# Patient Record
Sex: FEMALE | Race: WHITE | Employment: OTHER | URBAN - NONMETROPOLITAN AREA
[De-identification: names, ages, dates, MRNs, and addresses within clinical notes are randomized per-mention and may not be internally consistent; named-entity substitution may affect disease eponyms.]

---

## 2017-04-18 ENCOUNTER — HOSPITAL ENCOUNTER (OUTPATIENT)
Age: 82
Setting detail: SPECIMEN
Discharge: HOME | End: 2017-04-18
Payer: MEDICARE

## 2017-04-18 ENCOUNTER — OFFICE VISIT (OUTPATIENT)
Dept: UROLOGY | Age: 82
End: 2017-04-18
Payer: MEDICARE

## 2017-04-18 VITALS
SYSTOLIC BLOOD PRESSURE: 102 MMHG | WEIGHT: 187 LBS | HEIGHT: 59 IN | DIASTOLIC BLOOD PRESSURE: 64 MMHG | BODY MASS INDEX: 37.7 KG/M2

## 2017-04-18 DIAGNOSIS — N32.81 OAB (OVERACTIVE BLADDER): ICD-10-CM

## 2017-04-18 DIAGNOSIS — N39.46 MIXED INCONTINENCE: ICD-10-CM

## 2017-04-18 DIAGNOSIS — N32.81 OAB (OVERACTIVE BLADDER): Primary | ICD-10-CM

## 2017-04-18 LAB
-: ABNORMAL
AMORPHOUS: ABNORMAL
BACTERIA: ABNORMAL
BILIRUBIN URINE: NEGATIVE
CASTS UA: ABNORMAL /LPF
COLOR: YELLOW
COMMENT UA: ABNORMAL
CRYSTALS, UA: ABNORMAL /HPF
EPITHELIAL CELLS UA: ABNORMAL /HPF (ref 0–25)
GLUCOSE URINE: ABNORMAL
KETONES, URINE: NEGATIVE
LEUKOCYTE ESTERASE, URINE: NEGATIVE
MUCUS: ABNORMAL
NITRITE, URINE: NEGATIVE
OTHER OBSERVATIONS UA: ABNORMAL
PH UA: 5.5 (ref 5–9)
PROTEIN UA: NEGATIVE
RBC UA: ABNORMAL /HPF (ref 0–2)
RENAL EPITHELIAL, UA: ABNORMAL /HPF
SPECIFIC GRAVITY UA: 1.02 (ref 1.01–1.02)
TRICHOMONAS: ABNORMAL
TURBIDITY: CLEAR
URINE HGB: ABNORMAL
UROBILINOGEN, URINE: NORMAL
WBC UA: ABNORMAL /HPF (ref 0–5)
YEAST: ABNORMAL

## 2017-04-18 PROCEDURE — G8417 CALC BMI ABV UP PARAM F/U: HCPCS | Performed by: PHYSICIAN ASSISTANT

## 2017-04-18 PROCEDURE — 0509F URINE INCON PLAN DOCD: CPT | Performed by: PHYSICIAN ASSISTANT

## 2017-04-18 PROCEDURE — 51798 US URINE CAPACITY MEASURE: CPT | Performed by: PHYSICIAN ASSISTANT

## 2017-04-18 PROCEDURE — 4040F PNEUMOC VAC/ADMIN/RCVD: CPT | Performed by: PHYSICIAN ASSISTANT

## 2017-04-18 PROCEDURE — 81001 URINALYSIS AUTO W/SCOPE: CPT

## 2017-04-18 PROCEDURE — 99213 OFFICE O/P EST LOW 20 MIN: CPT | Performed by: PHYSICIAN ASSISTANT

## 2017-04-18 PROCEDURE — G8427 DOCREV CUR MEDS BY ELIG CLIN: HCPCS | Performed by: PHYSICIAN ASSISTANT

## 2017-04-18 PROCEDURE — 1090F PRES/ABSN URINE INCON ASSESS: CPT | Performed by: PHYSICIAN ASSISTANT

## 2017-04-18 PROCEDURE — 87086 URINE CULTURE/COLONY COUNT: CPT

## 2017-04-18 PROCEDURE — 1036F TOBACCO NON-USER: CPT | Performed by: PHYSICIAN ASSISTANT

## 2017-04-18 PROCEDURE — G8400 PT W/DXA NO RESULTS DOC: HCPCS | Performed by: PHYSICIAN ASSISTANT

## 2017-04-18 PROCEDURE — 1123F ACP DISCUSS/DSCN MKR DOCD: CPT | Performed by: PHYSICIAN ASSISTANT

## 2017-04-18 RX ORDER — TOLTERODINE TARTRATE 1 MG/1
TABLET, EXTENDED RELEASE ORAL
COMMUNITY
Start: 2017-04-11 | End: 2017-04-18

## 2017-04-18 ASSESSMENT — ENCOUNTER SYMPTOMS
ABDOMINAL DISTENTION: 0
NAUSEA: 0
ABDOMINAL PAIN: 1
VOMITING: 0
CONSTIPATION: 0
DIARRHEA: 0

## 2017-04-19 LAB
CULTURE: NO GROWTH
CULTURE: NORMAL
Lab: NORMAL
SPECIMEN DESCRIPTION: NORMAL
SPECIMEN DESCRIPTION: NORMAL
STATUS: NORMAL

## 2017-04-25 ENCOUNTER — TELEPHONE (OUTPATIENT)
Dept: UROLOGY | Age: 82
End: 2017-04-25

## 2017-08-09 ENCOUNTER — OFFICE VISIT (OUTPATIENT)
Dept: UROLOGY | Age: 82
End: 2017-08-09
Payer: MEDICARE

## 2017-08-09 VITALS
WEIGHT: 185 LBS | HEIGHT: 59 IN | SYSTOLIC BLOOD PRESSURE: 120 MMHG | BODY MASS INDEX: 37.29 KG/M2 | DIASTOLIC BLOOD PRESSURE: 78 MMHG

## 2017-08-09 DIAGNOSIS — N39.46 MIXED INCONTINENCE: ICD-10-CM

## 2017-08-09 DIAGNOSIS — N32.81 OAB (OVERACTIVE BLADDER): Primary | ICD-10-CM

## 2017-08-09 PROCEDURE — 51798 US URINE CAPACITY MEASURE: CPT | Performed by: NURSE PRACTITIONER

## 2017-08-09 PROCEDURE — 1090F PRES/ABSN URINE INCON ASSESS: CPT | Performed by: NURSE PRACTITIONER

## 2017-08-09 PROCEDURE — 4040F PNEUMOC VAC/ADMIN/RCVD: CPT | Performed by: NURSE PRACTITIONER

## 2017-08-09 PROCEDURE — G8427 DOCREV CUR MEDS BY ELIG CLIN: HCPCS | Performed by: NURSE PRACTITIONER

## 2017-08-09 PROCEDURE — G8400 PT W/DXA NO RESULTS DOC: HCPCS | Performed by: NURSE PRACTITIONER

## 2017-08-09 PROCEDURE — 1123F ACP DISCUSS/DSCN MKR DOCD: CPT | Performed by: NURSE PRACTITIONER

## 2017-08-09 PROCEDURE — 99213 OFFICE O/P EST LOW 20 MIN: CPT | Performed by: NURSE PRACTITIONER

## 2017-08-09 PROCEDURE — G8417 CALC BMI ABV UP PARAM F/U: HCPCS | Performed by: NURSE PRACTITIONER

## 2017-08-09 PROCEDURE — 0509F URINE INCON PLAN DOCD: CPT | Performed by: NURSE PRACTITIONER

## 2017-08-09 PROCEDURE — 1036F TOBACCO NON-USER: CPT | Performed by: NURSE PRACTITIONER

## 2017-08-09 RX ORDER — TOLTERODINE TARTRATE 1 MG/1
2 TABLET, EXTENDED RELEASE ORAL 2 TIMES DAILY
COMMUNITY
Start: 2017-07-12 | End: 2020-08-24

## 2017-08-09 RX ORDER — MELOXICAM 15 MG/1
TABLET ORAL
COMMUNITY
Start: 2017-06-06 | End: 2021-04-27

## 2017-08-09 RX ORDER — LEVOTHYROXINE SODIUM 0.1 MG/1
TABLET ORAL
COMMUNITY
Start: 2017-07-12 | End: 2021-04-27

## 2017-08-09 ASSESSMENT — ENCOUNTER SYMPTOMS
EYE PAIN: 0
COUGH: 0
VOMITING: 0
ABDOMINAL PAIN: 0
EYE REDNESS: 0
WHEEZING: 0
COLOR CHANGE: 0
BACK PAIN: 0
SHORTNESS OF BREATH: 0
NAUSEA: 0
CONSTIPATION: 0

## 2020-08-21 ENCOUNTER — HOSPITAL ENCOUNTER (OUTPATIENT)
Age: 85
Setting detail: SPECIMEN
Discharge: HOME | End: 2020-08-21
Payer: MEDICARE

## 2020-08-21 ENCOUNTER — OFFICE VISIT (OUTPATIENT)
Dept: UROLOGY | Age: 85
End: 2020-08-21
Payer: MEDICARE

## 2020-08-21 VITALS
WEIGHT: 173 LBS | BODY MASS INDEX: 34.88 KG/M2 | SYSTOLIC BLOOD PRESSURE: 132 MMHG | DIASTOLIC BLOOD PRESSURE: 72 MMHG | HEIGHT: 59 IN | TEMPERATURE: 97.3 F

## 2020-08-21 LAB
-: ABNORMAL
AMORPHOUS: ABNORMAL
BACTERIA: ABNORMAL
BILIRUBIN URINE: NEGATIVE
CASTS UA: ABNORMAL /LPF
COLOR: YELLOW
COMMENT UA: ABNORMAL
CRYSTALS, UA: ABNORMAL /HPF
EPITHELIAL CELLS UA: ABNORMAL /HPF (ref 0–25)
GLUCOSE URINE: NEGATIVE
KETONES, URINE: NEGATIVE
LEUKOCYTE ESTERASE, URINE: ABNORMAL
MUCUS: ABNORMAL
NITRITE, URINE: NEGATIVE
OTHER OBSERVATIONS UA: ABNORMAL
PH UA: 6 (ref 5–9)
PROTEIN UA: NEGATIVE
RBC UA: ABNORMAL /HPF (ref 0–2)
RENAL EPITHELIAL, UA: ABNORMAL /HPF
SPECIFIC GRAVITY UA: 1.02 (ref 1.01–1.02)
TRICHOMONAS: ABNORMAL
TURBIDITY: CLEAR
URINE HGB: NEGATIVE
UROBILINOGEN, URINE: NORMAL
WBC UA: ABNORMAL /HPF (ref 0–5)
YEAST: ABNORMAL

## 2020-08-21 PROCEDURE — 99211 OFF/OP EST MAY X REQ PHY/QHP: CPT | Performed by: UROLOGY

## 2020-08-21 PROCEDURE — 51798 US URINE CAPACITY MEASURE: CPT | Performed by: NURSE PRACTITIONER

## 2020-08-21 PROCEDURE — G8400 PT W/DXA NO RESULTS DOC: HCPCS | Performed by: NURSE PRACTITIONER

## 2020-08-21 PROCEDURE — G8417 CALC BMI ABV UP PARAM F/U: HCPCS | Performed by: NURSE PRACTITIONER

## 2020-08-21 PROCEDURE — 1123F ACP DISCUSS/DSCN MKR DOCD: CPT | Performed by: NURSE PRACTITIONER

## 2020-08-21 PROCEDURE — 81001 URINALYSIS AUTO W/SCOPE: CPT

## 2020-08-21 PROCEDURE — 87086 URINE CULTURE/COLONY COUNT: CPT

## 2020-08-21 PROCEDURE — 1090F PRES/ABSN URINE INCON ASSESS: CPT | Performed by: NURSE PRACTITIONER

## 2020-08-21 PROCEDURE — 99204 OFFICE O/P NEW MOD 45 MIN: CPT | Performed by: NURSE PRACTITIONER

## 2020-08-21 PROCEDURE — 0509F URINE INCON PLAN DOCD: CPT | Performed by: NURSE PRACTITIONER

## 2020-08-21 PROCEDURE — 1036F TOBACCO NON-USER: CPT | Performed by: NURSE PRACTITIONER

## 2020-08-21 PROCEDURE — G8427 DOCREV CUR MEDS BY ELIG CLIN: HCPCS | Performed by: NURSE PRACTITIONER

## 2020-08-21 PROCEDURE — 4040F PNEUMOC VAC/ADMIN/RCVD: CPT | Performed by: NURSE PRACTITIONER

## 2020-08-21 RX ORDER — AMLODIPINE BESYLATE 5 MG/1
TABLET ORAL
COMMUNITY
Start: 2020-06-05

## 2020-08-21 RX ORDER — METOPROLOL TARTRATE 100 MG/1
100 TABLET ORAL 2 TIMES DAILY
COMMUNITY
End: 2021-04-27

## 2020-08-21 NOTE — PATIENT INSTRUCTIONS
There are several changes you can make to your diet to help improve your urinary symptoms. The following have been shown to irritate the bladder and should be AVOIDED:  · Coffee  · Tea  · Dark colored sodas, avoid Mt. Dew also  · Alcohol  · Spicy foods  · Acidic foods    Decrease coffee to 1-2 cups per day      SURVEY:    You may be receiving a survey from [employer] regarding your visit today. Please complete the survey to enable us to provide the highest quality of care to you and your family. If you cannot score us a very good on any question, please call the office to discuss how we could of made your experience a very good one. Thank you.

## 2020-08-21 NOTE — PROGRESS NOTES
HPI:    Patient is a 80 y.o. female in no acute distress. She is alert and oriented to person, place, and time. History  2016 referred by PCP for incontinence x 2-3 years. Wears one pad per day which varies in level of saturation. Does not leak overnight. Typically doesn't have nocturia unless she drinks something right before bed. During day she has q2hr frequency, severe urgency, with UUI. She also leaks when she stands up from seated position and when she runs water. She does not notice significant leakage with cough, laugh, sneeze. Tried Ditropan, but never noticed improvement. Improvement with Vesicare, but unable to afford medication due to medication deductible needing met. Detrol 1 mg twice per day. She is urinating every 3 hours during the day, and she is getting up one time per night to urinate. She denies any urinary leakage, urgency, dysuria, or gross hematuria. Today  Here today with complaints of worsening incontinence. We have not seen patient since 2017. She does complain of frequency every 30 minutes to 1 hour. She has nocturia x1. She does complain of both urge and stress incontinence. She is wearing 3-4 pads per day. She denies any dysuria or gross hematuria. She does have daily bowel movements. She does consume 3 cups of coffee daily.  mL. She is no longer taking Detrol twice per day as previously prescribed. She does not know when or why this was stopped.       Past Medical History:   Diagnosis Date    Hyperlipidemia     Hypertension     Urinary incontinence      Past Surgical History:   Procedure Laterality Date    BACK SURGERY      CHOLECYSTECTOMY      COLONOSCOPY      HYSTERECTOMY      JOINT REPLACEMENT       Outpatient Encounter Medications as of 8/21/2020   Medication Sig Dispense Refill    amLODIPine (NORVASC) 5 MG tablet TAKE 1 TABLET BY MOUTH ONCE DAILY      levothyroxine (SYNTHROID) 100 MCG tablet       [DISCONTINUED] tolterodine (DETROL) 1 MG tablet 2 mg 2 times daily       losartan (COZAAR) 100 MG tablet       metoprolol succinate ER (TOPROL-XL) 100 MG XL tablet       losartan-hydrochlorothiazide (HYZAAR) 100-25 MG per tablet Take 1 tablet by mouth daily      calcium carbonate 600 MG TABS tablet Take 1 tablet by mouth daily      Multiple Vitamins-Minerals (ONE-A-DAY 50 PLUS PO) Take by mouth      terazosin (HYTRIN) 2 MG capsule Take 2 mg by mouth nightly      omeprazole (PRILOSEC) 20 MG capsule Take 20 mg by mouth daily      atorvastatin (LIPITOR) 40 MG tablet Take 40 mg by mouth daily      metoprolol (LOPRESSOR) 100 MG tablet Take 100 mg by mouth 2 times daily      meloxicam (MOBIC) 15 MG tablet       [DISCONTINUED] metoprolol (LOPRESSOR) 100 MG tablet Take 100 mg by mouth 2 times daily       No facility-administered encounter medications on file as of 8/21/2020. Current Outpatient Medications on File Prior to Visit   Medication Sig Dispense Refill    amLODIPine (NORVASC) 5 MG tablet TAKE 1 TABLET BY MOUTH ONCE DAILY      levothyroxine (SYNTHROID) 100 MCG tablet       losartan (COZAAR) 100 MG tablet       metoprolol succinate ER (TOPROL-XL) 100 MG XL tablet       losartan-hydrochlorothiazide (HYZAAR) 100-25 MG per tablet Take 1 tablet by mouth daily      calcium carbonate 600 MG TABS tablet Take 1 tablet by mouth daily      Multiple Vitamins-Minerals (ONE-A-DAY 50 PLUS PO) Take by mouth      terazosin (HYTRIN) 2 MG capsule Take 2 mg by mouth nightly      omeprazole (PRILOSEC) 20 MG capsule Take 20 mg by mouth daily      atorvastatin (LIPITOR) 40 MG tablet Take 40 mg by mouth daily      metoprolol (LOPRESSOR) 100 MG tablet Take 100 mg by mouth 2 times daily      meloxicam (MOBIC) 15 MG tablet        No current facility-administered medications on file prior to visit.       Percocet [oxycodone-acetaminophen] and Sulfa antibiotics  Family History   Problem Relation Age of Onset    Stroke Mother    Lance Quigley Other Father Social History     Tobacco Use   Smoking Status Never Smoker   Smokeless Tobacco Never Used       Social History     Substance and Sexual Activity   Alcohol Use No       Review of Systems   Constitutional: Negative for appetite change, chills and fever. Eyes: Negative for pain, redness and visual disturbance. Respiratory: Negative for cough, shortness of breath and wheezing. Cardiovascular: Negative for chest pain and leg swelling. Gastrointestinal: Negative for abdominal pain, constipation, nausea and vomiting. Genitourinary: Positive for enuresis, frequency and urgency. Negative for difficulty urinating, dysuria, flank pain, hematuria, pelvic pain, vaginal bleeding and vaginal discharge. Musculoskeletal: Negative for back pain, joint swelling and myalgias. Skin: Negative for color change, rash and wound. Neurological: Negative for dizziness, tremors and numbness. Hematological: Negative for adenopathy. Does not bruise/bleed easily. /72 (Site: Right Upper Arm, Position: Sitting, Cuff Size: Medium Adult)   Temp 97.3 °F (36.3 °C) (Temporal)   Ht 4' 11\" (1.499 m)   Wt 173 lb (78.5 kg)   BMI 34.94 kg/m²       PHYSICAL EXAM:  Constitutional: Patient resting comfortably, in no acute distress. Neuro: Alert and oriented to person place and time. Cranial nerves grossly intact. Psych: Mood and affect normal.  Skin: Warm, dry  HEENT: normocephalic, atraumatic  Lymphatics: No palpable lymphadenopathy  Lungs: Respiratory effort normal, unlabored  Cardiovascular:  Normal peripheral pulses  Abdomen: Soft, non-tender, non-distended with no organomegaly or palpable masses. : No CVA tenderness. Bladder non-tender and not distended. Pelvic: Deferred    No results found for: BUN  No results found for: CREATININE    ASSESSMENT:   Diagnosis Orders   1. Frequency of urination  WI MEASUREMENT,POST-VOID RESIDUAL VOLUME BY US,NON-IMAGING    Urinalysis with Microscopic    Culture, Urine   2. Mixed incontinence  MO MEASUREMENT,POST-VOID RESIDUAL VOLUME BY US,NON-IMAGING    Urinalysis with Microscopic    Culture, Urine           PLAN:  We will check a UA C&S. If the urine culture is negative we will resume Detrol twice per day. She will follow-up in 6 weeks or sooner if needed.

## 2020-08-22 LAB
CULTURE: NORMAL
Lab: NORMAL
SPECIMEN DESCRIPTION: NORMAL

## 2020-08-24 ENCOUNTER — TELEPHONE (OUTPATIENT)
Dept: UROLOGY | Age: 85
End: 2020-08-24

## 2020-08-24 RX ORDER — TOLTERODINE TARTRATE 2 MG/1
1 TABLET, EXTENDED RELEASE ORAL 2 TIMES DAILY
Qty: 60 TABLET | Refills: 1 | Status: SHIPPED | OUTPATIENT
Start: 2020-08-24 | End: 2020-08-26

## 2020-08-24 NOTE — TELEPHONE ENCOUNTER
Patient notified of negative urine culture results and new medication.  Patient voiced understanding and scheduled f/u

## 2020-08-25 ASSESSMENT — ENCOUNTER SYMPTOMS
COLOR CHANGE: 0
COUGH: 0
BACK PAIN: 0
EYE REDNESS: 0
VOMITING: 0
NAUSEA: 0
WHEEZING: 0
EYE PAIN: 0
ABDOMINAL PAIN: 0
SHORTNESS OF BREATH: 0
CONSTIPATION: 0

## 2020-08-26 ENCOUNTER — TELEPHONE (OUTPATIENT)
Dept: UROLOGY | Age: 85
End: 2020-08-26

## 2020-08-27 RX ORDER — TOLTERODINE TARTRATE 2 MG/1
1 TABLET, EXTENDED RELEASE ORAL 2 TIMES DAILY
Qty: 60 TABLET | Refills: 1 | Status: SHIPPED | OUTPATIENT
Start: 2020-08-27 | End: 2020-10-13 | Stop reason: SDUPTHER

## 2020-10-13 ENCOUNTER — OFFICE VISIT (OUTPATIENT)
Dept: UROLOGY | Age: 85
End: 2020-10-13
Payer: MEDICARE

## 2020-10-13 VITALS
BODY MASS INDEX: 35.9 KG/M2 | DIASTOLIC BLOOD PRESSURE: 78 MMHG | RESPIRATION RATE: 20 BRPM | HEIGHT: 59 IN | TEMPERATURE: 96.9 F | SYSTOLIC BLOOD PRESSURE: 116 MMHG | WEIGHT: 178.1 LBS

## 2020-10-13 PROCEDURE — 4040F PNEUMOC VAC/ADMIN/RCVD: CPT | Performed by: NURSE PRACTITIONER

## 2020-10-13 PROCEDURE — 0509F URINE INCON PLAN DOCD: CPT | Performed by: NURSE PRACTITIONER

## 2020-10-13 PROCEDURE — G8427 DOCREV CUR MEDS BY ELIG CLIN: HCPCS | Performed by: NURSE PRACTITIONER

## 2020-10-13 PROCEDURE — 1090F PRES/ABSN URINE INCON ASSESS: CPT | Performed by: NURSE PRACTITIONER

## 2020-10-13 PROCEDURE — G8484 FLU IMMUNIZE NO ADMIN: HCPCS | Performed by: NURSE PRACTITIONER

## 2020-10-13 PROCEDURE — G8417 CALC BMI ABV UP PARAM F/U: HCPCS | Performed by: NURSE PRACTITIONER

## 2020-10-13 PROCEDURE — 51798 US URINE CAPACITY MEASURE: CPT | Performed by: NURSE PRACTITIONER

## 2020-10-13 PROCEDURE — 1123F ACP DISCUSS/DSCN MKR DOCD: CPT | Performed by: NURSE PRACTITIONER

## 2020-10-13 PROCEDURE — 1036F TOBACCO NON-USER: CPT | Performed by: NURSE PRACTITIONER

## 2020-10-13 PROCEDURE — G8400 PT W/DXA NO RESULTS DOC: HCPCS | Performed by: NURSE PRACTITIONER

## 2020-10-13 PROCEDURE — 99214 OFFICE O/P EST MOD 30 MIN: CPT | Performed by: NURSE PRACTITIONER

## 2020-10-13 RX ORDER — TOLTERODINE TARTRATE 2 MG/1
2 TABLET, EXTENDED RELEASE ORAL 2 TIMES DAILY
Qty: 60 TABLET | Refills: 1 | Status: SHIPPED | OUTPATIENT
Start: 2020-10-13 | End: 2020-11-24 | Stop reason: SDUPTHER

## 2020-10-13 RX ORDER — LEVOTHYROXINE SODIUM 88 UG/1
TABLET ORAL
COMMUNITY
Start: 2020-10-05

## 2020-10-13 NOTE — PROGRESS NOTES
HPI:        Patient is a 80 y.o. female in no acute distress. She is alert and oriented to person, place, and time. History  2016 referred by PCP for incontinence x 2-3 years. Wears one pad per day which varies in level of saturation. Does not leak overnight. Typically doesn't have nocturia unless she drinks something right before bed. During day she has q2hr frequency, severe urgency, with UUI. She also leaks when she stands up from seated position and when she runs water. She does not notice significant leakage with cough, laugh, sneeze. Tried Ditropan, but never noticed improvement. Improvement with Vesicare, but unable to afford medication due to medication deductible needing met. Detrol 1 mg twice per day. She is urinating every 3 hours during the day, and she is getting up one time per night to urinate. She denies any urinary leakage, urgency, dysuria, or gross hematuria. 9/2020 frequency every 30 minutes to 1 hour. She has nocturia x1. She does complain of both urge and stress incontinence. She is wearing 3-4 pads per day. Resumed detrol 1mg BID    Today  Here today to follow-up for frequency and incontinence. At her last visit we did resume Detrol 1 mg twice per day. She denies any dry mouth or constipation. She does feel that her nocturia has improved and she is urinating every 2 hours during the day. She does continue to complain of incontinence and wearing 3-4 pads per day. She does feel her incontinence is worse when she goes from a standing to sitting position. PVR 26 mL.   Past Medical History:   Diagnosis Date    Hyperlipidemia     Hypertension     Urinary incontinence      Past Surgical History:   Procedure Laterality Date    BACK SURGERY      CHOLECYSTECTOMY      COLONOSCOPY      HYSTERECTOMY      JOINT REPLACEMENT       Outpatient Encounter Medications as of 10/13/2020   Medication Sig Dispense Refill    levothyroxine (SYNTHROID) 88 MCG tablet TAKE 1 TABLET BY MOUTH ONCE DAILY      tolterodine (DETROL) 2 MG tablet Take 0.5 tablets by mouth 2 times daily 60 tablet 1    amLODIPine (NORVASC) 5 MG tablet TAKE 1 TABLET BY MOUTH ONCE DAILY      metoprolol succinate ER (TOPROL-XL) 100 MG XL tablet       losartan-hydrochlorothiazide (HYZAAR) 100-25 MG per tablet Take 1 tablet by mouth daily      calcium carbonate 600 MG TABS tablet Take 1 tablet by mouth daily      Multiple Vitamins-Minerals (ONE-A-DAY 50 PLUS PO) Take by mouth      terazosin (HYTRIN) 2 MG capsule Take 2 mg by mouth nightly      omeprazole (PRILOSEC) 20 MG capsule Take 20 mg by mouth daily      atorvastatin (LIPITOR) 40 MG tablet Take 40 mg by mouth daily      metoprolol (LOPRESSOR) 100 MG tablet Take 100 mg by mouth 2 times daily      levothyroxine (SYNTHROID) 100 MCG tablet       meloxicam (MOBIC) 15 MG tablet       losartan (COZAAR) 100 MG tablet        No facility-administered encounter medications on file as of 10/13/2020.        Current Outpatient Medications on File Prior to Visit   Medication Sig Dispense Refill    levothyroxine (SYNTHROID) 88 MCG tablet TAKE 1 TABLET BY MOUTH ONCE DAILY      tolterodine (DETROL) 2 MG tablet Take 0.5 tablets by mouth 2 times daily 60 tablet 1    amLODIPine (NORVASC) 5 MG tablet TAKE 1 TABLET BY MOUTH ONCE DAILY      metoprolol succinate ER (TOPROL-XL) 100 MG XL tablet       losartan-hydrochlorothiazide (HYZAAR) 100-25 MG per tablet Take 1 tablet by mouth daily      calcium carbonate 600 MG TABS tablet Take 1 tablet by mouth daily      Multiple Vitamins-Minerals (ONE-A-DAY 50 PLUS PO) Take by mouth      terazosin (HYTRIN) 2 MG capsule Take 2 mg by mouth nightly      omeprazole (PRILOSEC) 20 MG capsule Take 20 mg by mouth daily      atorvastatin (LIPITOR) 40 MG tablet Take 40 mg by mouth daily      metoprolol (LOPRESSOR) 100 MG tablet Take 100 mg by mouth 2 times daily      levothyroxine (SYNTHROID) 100 MCG tablet       meloxicam (MOBIC) 15 MG tablet       losartan (COZAAR) 100 MG tablet        No current facility-administered medications on file prior to visit. Percocet [oxycodone-acetaminophen] and Sulfa antibiotics  Family History   Problem Relation Age of Onset    Stroke Mother     Other Father      Social History     Tobacco Use   Smoking Status Never Smoker   Smokeless Tobacco Never Used       Social History     Substance and Sexual Activity   Alcohol Use No       Review of Systems   Constitutional: Negative for appetite change, chills and fever. Eyes: Negative for pain, redness and visual disturbance. Respiratory: Negative for cough, shortness of breath and wheezing. Cardiovascular: Negative for chest pain and leg swelling. Gastrointestinal: Negative for abdominal pain, constipation, nausea and vomiting. Genitourinary: Positive for enuresis and frequency. Negative for difficulty urinating, dysuria, flank pain, hematuria, pelvic pain, vaginal bleeding and vaginal discharge. Musculoskeletal: Negative for back pain, joint swelling and myalgias. Skin: Negative for color change, rash and wound. Neurological: Negative for dizziness, tremors and numbness. Hematological: Negative for adenopathy. Does not bruise/bleed easily. /78 (Site: Right Upper Arm, Position: Sitting, Cuff Size: Large Adult)   Temp 96.9 °F (36.1 °C) (Temporal)   Resp 20   Ht 4' 11\" (1.499 m)   Wt 178 lb 1.6 oz (80.8 kg)   BMI 35.97 kg/m²       PHYSICAL EXAM:  Constitutional: Patient resting comfortably, in no acute distress. Neuro: Alert and oriented to person place and time. Cranial nerves grossly intact. Psych: Mood and affect normal.  Skin: Warm, dry  HEENT: normocephalic, atraumatic  Lymphatics: No palpable lymphadenopathy  Lungs: Respiratory effort normal, unlabored  Cardiovascular:  Normal peripheral pulses  Abdomen: Soft, non-tender, non-distended with no organomegaly or palpable masses. : No CVA tenderness. Bladder non-tender and not distended. Pelvic: Deferred    No results found for: BUN  No results found for: CREATININE    ASSESSMENT:   Diagnosis Orders   1. Mixed incontinence  [place] MEASUREMENT,POST-VOID RESIDUAL VOLUME BY US,NON-IMAGING   2. Frequency of urination  CT MEASUREMENT,POST-VOID RESIDUAL VOLUME BY US,NON-IMAGING           PLAN:  We will increase Detrol to 2 mg twice per day. I again explained to her that medications will not improve stress incontinence. We did discuss pelvic floor therapy. She does not have any physical therapy benefits left for the rest of the year. We will reevaluate pelvic floor therapy in the new year. We will see her back in 6 weeks to evaluate effectiveness of Detrol to treat her frequency and urge incontinence.

## 2020-10-13 NOTE — PATIENT INSTRUCTIONS
SURVEY:    You may be receiving a survey from [employer] regarding your visit today. Please complete the survey to enable us to provide the highest quality of care to you and your family. If you cannot score us a very good on any question, please call the office to discuss how we could have made your experience a very good one. Thank you.

## 2020-10-14 ASSESSMENT — ENCOUNTER SYMPTOMS
CONSTIPATION: 0
EYE PAIN: 0
WHEEZING: 0
SHORTNESS OF BREATH: 0
COUGH: 0
VOMITING: 0
COLOR CHANGE: 0
NAUSEA: 0
ABDOMINAL PAIN: 0
EYE REDNESS: 0
BACK PAIN: 0

## 2020-11-24 ENCOUNTER — OFFICE VISIT (OUTPATIENT)
Dept: UROLOGY | Age: 85
End: 2020-11-24
Payer: MEDICARE

## 2020-11-24 VITALS
WEIGHT: 177 LBS | HEIGHT: 59 IN | DIASTOLIC BLOOD PRESSURE: 80 MMHG | BODY MASS INDEX: 35.68 KG/M2 | SYSTOLIC BLOOD PRESSURE: 116 MMHG | TEMPERATURE: 96.9 F

## 2020-11-24 PROCEDURE — 99213 OFFICE O/P EST LOW 20 MIN: CPT | Performed by: NURSE PRACTITIONER

## 2020-11-24 PROCEDURE — 51798 US URINE CAPACITY MEASURE: CPT | Performed by: NURSE PRACTITIONER

## 2020-11-24 PROCEDURE — G8400 PT W/DXA NO RESULTS DOC: HCPCS | Performed by: NURSE PRACTITIONER

## 2020-11-24 PROCEDURE — G8484 FLU IMMUNIZE NO ADMIN: HCPCS | Performed by: NURSE PRACTITIONER

## 2020-11-24 PROCEDURE — 4040F PNEUMOC VAC/ADMIN/RCVD: CPT | Performed by: NURSE PRACTITIONER

## 2020-11-24 PROCEDURE — 1123F ACP DISCUSS/DSCN MKR DOCD: CPT | Performed by: NURSE PRACTITIONER

## 2020-11-24 PROCEDURE — G8417 CALC BMI ABV UP PARAM F/U: HCPCS | Performed by: NURSE PRACTITIONER

## 2020-11-24 PROCEDURE — 1090F PRES/ABSN URINE INCON ASSESS: CPT | Performed by: NURSE PRACTITIONER

## 2020-11-24 PROCEDURE — G8427 DOCREV CUR MEDS BY ELIG CLIN: HCPCS | Performed by: NURSE PRACTITIONER

## 2020-11-24 PROCEDURE — 1036F TOBACCO NON-USER: CPT | Performed by: NURSE PRACTITIONER

## 2020-11-24 PROCEDURE — 0509F URINE INCON PLAN DOCD: CPT | Performed by: NURSE PRACTITIONER

## 2020-11-24 RX ORDER — TOLTERODINE TARTRATE 2 MG/1
2 TABLET, EXTENDED RELEASE ORAL 2 TIMES DAILY
Qty: 180 TABLET | Refills: 3 | Status: SHIPPED | OUTPATIENT
Start: 2020-11-24 | End: 2021-05-25 | Stop reason: ALTCHOICE

## 2020-11-24 RX ORDER — VALSARTAN 160 MG/1
TABLET ORAL
COMMUNITY
Start: 2020-10-21 | End: 2021-04-27

## 2020-11-24 ASSESSMENT — ENCOUNTER SYMPTOMS
BACK PAIN: 0
COLOR CHANGE: 0
CONSTIPATION: 0
WHEEZING: 0
NAUSEA: 0
COUGH: 0
EYE REDNESS: 0
VOMITING: 0
ABDOMINAL PAIN: 0
SHORTNESS OF BREATH: 0

## 2020-11-24 NOTE — PROGRESS NOTES
HPI:        Patient is a 80 y.o. female in no acute distress. She is alert and oriented to person, place, and time. History  2016 referred by PCP for incontinence x 2-3 years. Wears one pad per day which varies in level of saturation. Does not leak overnight. Typically doesn't have nocturia unless she drinks something right before bed. During day she has q2hr frequency, severe urgency, with UUI. She also leaks when she stands up from seated position and when she runs water. She does not notice significant leakage with cough, laugh, sneeze. Tried Ditropan, but never noticed improvement. Improvement with Vesicare, but unable to afford medication due to medication deductible needing met. Detrol 1 mg twice per day. She is urinating every 3 hours during the day, and she is getting up one time per night to urinate. She denies any urinary leakage, urgency, dysuria, or gross hematuria. 9/2020 frequency every 30 minutes to 1 hour. She has nocturia x1. She does complain of both urge and stress incontinence. She is wearing 3-4 pads per day. Resumed detrol 1mg BID    Today  Here today to follow-up for frequency and incontinence. At her last visit we increased Detrol to 2mg twice per day. She denies any dry mouth or constipation. She does feel that her nocturia has improved and she is urinating every 2-3 hours during the day. Incontinence has improved and now she uses 2 pads in a 24-hour period. She does feel her incontinence is worse when she goes from a standing to sitting position. PVR 0 mL.     Past Medical History:   Diagnosis Date    Hyperlipidemia     Hypertension     Urinary incontinence      Past Surgical History:   Procedure Laterality Date    BACK SURGERY      CHOLECYSTECTOMY      COLONOSCOPY      HYSTERECTOMY      JOINT REPLACEMENT       Outpatient Encounter Medications as of 11/24/2020   Medication Sig Dispense Refill    levothyroxine (SYNTHROID) 88 MCG tablet TAKE 1 TABLET BY MOUTH ONCE DAILY      tolterodine (DETROL) 2 MG tablet Take 1 tablet by mouth 2 times daily 60 tablet 1    amLODIPine (NORVASC) 5 MG tablet TAKE 1 TABLET BY MOUTH ONCE DAILY      losartan-hydrochlorothiazide (HYZAAR) 100-25 MG per tablet Take 1 tablet by mouth daily      calcium carbonate 600 MG TABS tablet Take 1 tablet by mouth daily      Multiple Vitamins-Minerals (ONE-A-DAY 50 PLUS PO) Take by mouth      terazosin (HYTRIN) 2 MG capsule Take 2 mg by mouth nightly      omeprazole (PRILOSEC) 20 MG capsule Take 20 mg by mouth daily      atorvastatin (LIPITOR) 40 MG tablet Take 40 mg by mouth daily      valsartan (DIOVAN) 160 MG tablet       metoprolol (LOPRESSOR) 100 MG tablet Take 100 mg by mouth 2 times daily      levothyroxine (SYNTHROID) 100 MCG tablet       meloxicam (MOBIC) 15 MG tablet       losartan (COZAAR) 100 MG tablet       metoprolol succinate ER (TOPROL-XL) 100 MG XL tablet        No facility-administered encounter medications on file as of 11/24/2020.        Current Outpatient Medications on File Prior to Visit   Medication Sig Dispense Refill    levothyroxine (SYNTHROID) 88 MCG tablet TAKE 1 TABLET BY MOUTH ONCE DAILY      tolterodine (DETROL) 2 MG tablet Take 1 tablet by mouth 2 times daily 60 tablet 1    amLODIPine (NORVASC) 5 MG tablet TAKE 1 TABLET BY MOUTH ONCE DAILY      losartan-hydrochlorothiazide (HYZAAR) 100-25 MG per tablet Take 1 tablet by mouth daily      calcium carbonate 600 MG TABS tablet Take 1 tablet by mouth daily      Multiple Vitamins-Minerals (ONE-A-DAY 50 PLUS PO) Take by mouth      terazosin (HYTRIN) 2 MG capsule Take 2 mg by mouth nightly      omeprazole (PRILOSEC) 20 MG capsule Take 20 mg by mouth daily      atorvastatin (LIPITOR) 40 MG tablet Take 40 mg by mouth daily      valsartan (DIOVAN) 160 MG tablet       metoprolol (LOPRESSOR) 100 MG tablet Take 100 mg by mouth 2 times daily      levothyroxine (SYNTHROID) 100 MCG tablet       meloxicam (MOBIC) 15 MG tablet       losartan (COZAAR) 100 MG tablet       metoprolol succinate ER (TOPROL-XL) 100 MG XL tablet        No current facility-administered medications on file prior to visit. Percocet [oxycodone-acetaminophen] and Sulfa antibiotics  Family History   Problem Relation Age of Onset    Stroke Mother     Other Father      Social History     Tobacco Use   Smoking Status Never Smoker   Smokeless Tobacco Never Used       Social History     Substance and Sexual Activity   Alcohol Use No       Review of Systems   Constitutional: Negative for appetite change, chills and fever. Eyes: Negative for redness and visual disturbance. Respiratory: Negative for cough, shortness of breath and wheezing. Cardiovascular: Negative for chest pain and leg swelling. Gastrointestinal: Negative for abdominal pain, constipation, nausea and vomiting. Genitourinary: Positive for enuresis. Negative for difficulty urinating, dysuria, flank pain, frequency, hematuria, pelvic pain, urgency, vaginal bleeding and vaginal discharge. Musculoskeletal: Negative for back pain, joint swelling and myalgias. Skin: Negative for color change, rash and wound. Neurological: Negative for dizziness, tremors and numbness. Hematological: Negative for adenopathy. Does not bruise/bleed easily. /80 (Site: Right Upper Arm, Position: Sitting, Cuff Size: Medium Adult) Comment: manual  Temp 96.9 °F (36.1 °C) (Temporal)   Ht 4' 11\" (1.499 m)   Wt 177 lb (80.3 kg)   BMI 35.75 kg/m²       PHYSICAL EXAM:  Constitutional: Patient resting comfortably, in no acute distress. Neuro: Alert and oriented to person place and time. Cranial nerves grossly intact.     Psych: Mood and affect normal.  Skin: Warm, dry  HEENT: normocephalic, atraumatic  Lymphatics: No palpable lymphadenopathy  Lungs: Respiratory effort normal, unlabored  Cardiovascular:  Normal peripheral pulses  Abdomen: Soft, non-tender, non-distended with no organomegaly or palpable masses. : No CVA tenderness. Bladder non-tender and not distended. Pelvic: Deferred    No results found for: BUN  No results found for: CREATININE    ASSESSMENT:   Diagnosis Orders   1. Mixed incontinence  [place] MEASUREMENT,POST-VOID RESIDUAL VOLUME BY US,NON-IMAGING   2. Frequency of urination  [place] MEASUREMENT,POST-VOID RESIDUAL VOLUME BY US,NON-IMAGING           PLAN:  She will continue Detrol 2 mg twice per day. We will see her back in 6 months or sooner if needed. We will discuss pelvic floor therapy for stress incontinence at this visit.

## 2020-11-24 NOTE — PATIENT INSTRUCTIONS
SURVEY:    You may be receiving a survey from [employer] regarding your visit today. Please complete the survey to enable us to provide the highest quality of care to you and your family. If you cannot score us a very good on any question, please call the office to discuss how we could have made your experience a very good one. Thank you.

## 2021-04-27 PROBLEM — R07.89 OTHER CHEST PAIN: Status: ACTIVE | Noted: 2021-04-27

## 2021-04-27 PROBLEM — I10 ESSENTIAL HYPERTENSION: Status: ACTIVE | Noted: 2021-04-27

## 2021-04-27 PROBLEM — K56.609 LARGE BOWEL OBSTRUCTION (HCC): Status: ACTIVE | Noted: 2021-04-27

## 2021-04-28 PROBLEM — R07.9 CHEST PAIN: Status: ACTIVE | Noted: 2021-04-27

## 2021-05-25 ENCOUNTER — HOSPITAL ENCOUNTER (OUTPATIENT)
Age: 86
Setting detail: SPECIMEN
Discharge: HOME | End: 2021-05-25
Payer: MEDICARE

## 2021-05-25 ENCOUNTER — OFFICE VISIT (OUTPATIENT)
Dept: UROLOGY | Age: 86
End: 2021-05-25
Payer: MEDICARE

## 2021-05-25 VITALS
HEART RATE: 77 BPM | SYSTOLIC BLOOD PRESSURE: 166 MMHG | WEIGHT: 174 LBS | BODY MASS INDEX: 36.37 KG/M2 | DIASTOLIC BLOOD PRESSURE: 80 MMHG

## 2021-05-25 DIAGNOSIS — R35.1 NOCTURIA: ICD-10-CM

## 2021-05-25 DIAGNOSIS — N32.81 OAB (OVERACTIVE BLADDER): ICD-10-CM

## 2021-05-25 DIAGNOSIS — N39.46 MIXED INCONTINENCE: Primary | ICD-10-CM

## 2021-05-25 DIAGNOSIS — N39.46 MIXED INCONTINENCE: ICD-10-CM

## 2021-05-25 DIAGNOSIS — R35.0 FREQUENCY OF URINATION: ICD-10-CM

## 2021-05-25 DIAGNOSIS — K59.00 CONSTIPATION, UNSPECIFIED CONSTIPATION TYPE: ICD-10-CM

## 2021-05-25 LAB
-: ABNORMAL
AMORPHOUS: ABNORMAL
BACTERIA: ABNORMAL
BILIRUBIN URINE: NEGATIVE
CASTS UA: ABNORMAL /LPF
COLOR: YELLOW
COMMENT UA: ABNORMAL
CRYSTALS, UA: ABNORMAL /HPF
EPITHELIAL CELLS UA: ABNORMAL /HPF (ref 0–25)
GLUCOSE URINE: NEGATIVE
KETONES, URINE: NEGATIVE
LEUKOCYTE ESTERASE, URINE: ABNORMAL
MUCUS: ABNORMAL
NITRITE, URINE: NEGATIVE
OTHER OBSERVATIONS UA: ABNORMAL
PH UA: 6 (ref 5–9)
PROTEIN UA: NEGATIVE
RBC UA: ABNORMAL /HPF (ref 0–2)
RENAL EPITHELIAL, UA: ABNORMAL /HPF
SPECIFIC GRAVITY UA: 1.01 (ref 1.01–1.02)
TRICHOMONAS: ABNORMAL
TURBIDITY: CLEAR
URINE HGB: NEGATIVE
UROBILINOGEN, URINE: NORMAL
WBC UA: ABNORMAL /HPF (ref 0–5)
YEAST: ABNORMAL

## 2021-05-25 PROCEDURE — 51798 US URINE CAPACITY MEASURE: CPT | Performed by: NURSE PRACTITIONER

## 2021-05-25 PROCEDURE — 1036F TOBACCO NON-USER: CPT | Performed by: NURSE PRACTITIONER

## 2021-05-25 PROCEDURE — 81001 URINALYSIS AUTO W/SCOPE: CPT

## 2021-05-25 PROCEDURE — 4040F PNEUMOC VAC/ADMIN/RCVD: CPT | Performed by: NURSE PRACTITIONER

## 2021-05-25 PROCEDURE — 87086 URINE CULTURE/COLONY COUNT: CPT

## 2021-05-25 PROCEDURE — 99214 OFFICE O/P EST MOD 30 MIN: CPT | Performed by: NURSE PRACTITIONER

## 2021-05-25 PROCEDURE — G8417 CALC BMI ABV UP PARAM F/U: HCPCS | Performed by: NURSE PRACTITIONER

## 2021-05-25 PROCEDURE — G8427 DOCREV CUR MEDS BY ELIG CLIN: HCPCS | Performed by: NURSE PRACTITIONER

## 2021-05-25 PROCEDURE — 1111F DSCHRG MED/CURRENT MED MERGE: CPT | Performed by: NURSE PRACTITIONER

## 2021-05-25 PROCEDURE — 0509F URINE INCON PLAN DOCD: CPT | Performed by: NURSE PRACTITIONER

## 2021-05-25 PROCEDURE — G8400 PT W/DXA NO RESULTS DOC: HCPCS | Performed by: NURSE PRACTITIONER

## 2021-05-25 PROCEDURE — 1090F PRES/ABSN URINE INCON ASSESS: CPT | Performed by: NURSE PRACTITIONER

## 2021-05-25 PROCEDURE — 1123F ACP DISCUSS/DSCN MKR DOCD: CPT | Performed by: NURSE PRACTITIONER

## 2021-05-25 RX ORDER — POLYETHYLENE GLYCOL 3350 17 G/17G
17 POWDER, FOR SOLUTION ORAL 2 TIMES DAILY
Qty: 180 EACH | Refills: 3 | Status: SHIPPED | OUTPATIENT
Start: 2021-05-25

## 2021-05-25 RX ORDER — TROSPIUM CHLORIDE 20 MG/1
20 TABLET, FILM COATED ORAL 2 TIMES DAILY
Qty: 60 TABLET | Refills: 3 | Status: SHIPPED | OUTPATIENT
Start: 2021-05-25 | End: 2021-07-29 | Stop reason: SDUPTHER

## 2021-05-25 RX ORDER — NAPROXEN SODIUM 220 MG
220 TABLET ORAL 2 TIMES DAILY WITH MEALS
COMMUNITY

## 2021-05-25 ASSESSMENT — ENCOUNTER SYMPTOMS
EYE REDNESS: 0
COLOR CHANGE: 0
WHEEZING: 0
CONSTIPATION: 1
BACK PAIN: 0
SHORTNESS OF BREATH: 0
VOMITING: 0
NAUSEA: 0
COUGH: 0
ABDOMINAL PAIN: 0

## 2021-05-25 NOTE — PATIENT INSTRUCTIONS
It is very important to have regular, daily, bowel movements because this will improve urinary symptoms. Take Miralax (or generic equivalent) 17 g (one capful) twice per day. If you develop loose stools decrease miralax to daily.     Drink 64-80 ounces of water per day    Decrease coffee to 2 cups per day

## 2021-05-25 NOTE — PROGRESS NOTES
HPI:        Patient is a 80 y.o. female in no acute distress. She is alert and oriented to person, place, and time. History  2016 referred by PCP for incontinence x 2-3 years. Wears one pad per day which varies in level of saturation. Does not leak overnight. Typically doesn't have nocturia unless she drinks something right before bed. During day she has q2hr frequency, severe urgency, with UUI. She also leaks when she stands up from seated position and when she runs water. She does not notice significant leakage with cough, laugh, sneeze. Tried Ditropan, but never noticed improvement. Improvement with Vesicare, but unable to afford medication due to medication deductible needing met. Detrol 1 mg twice per day. She is urinating every 3 hours during the day, and she is getting up one time per night to urinate. She denies any urinary leakage, urgency, dysuria, or gross hematuria. 2020 frequency every 30 minutes to 1 hour. She has nocturia x1. She does complain of both urge and stress incontinence. She is wearing 3-4 pads per day. Resumed detrol 1mg BID    Today  Here today to follow-up for frequency and incontinence. She is taking Detrol 2mg twice per day. She does not feel that this medication is working for her. Over the last 3 months she has had worsening urinary symptoms. She complains of nocturia 2-3 times per night and frequency every 30 minutes. She is wearing 34 pads per day. She has had worsening constipation and was admitted to the hospital for partial bowel obstruction. She denies any dysuria or gross hematuria. PVR is low, 34ml.       Past Medical History:   Diagnosis Date    Hyperlipidemia     Hypertension     Hypothyroidism     Urinary incontinence      Past Surgical History:   Procedure Laterality Date    ABDOMEN SURGERY  2019    removal of large colon polyp    BACK SURGERY       SECTION      x3    CHOLECYSTECTOMY      COLON SURGERY      COLONOSCOPY      HYSTERECTOMY      JOINT REPLACEMENT       Outpatient Encounter Medications as of 5/25/2021   Medication Sig Dispense Refill    naproxen sodium (ALEVE) 220 MG tablet Take 220 mg by mouth 2 times daily (with meals)      trospium (SANCTURA) 20 MG tablet Take 1 tablet by mouth 2 times daily 60 tablet 3    polyethylene glycol (GLYCOLAX) 17 g packet Take 17 g by mouth 2 times daily 180 each 3    omeprazole (PRILOSEC) 40 MG delayed release capsule Take 1 capsule by mouth 2 times daily 60 capsule 0    valsartan (DIOVAN) 160 MG tablet Take 160 mg by mouth daily      levothyroxine (SYNTHROID) 88 MCG tablet TAKE 1 TABLET BY MOUTH ONCE DAILY      amLODIPine (NORVASC) 5 MG tablet TAKE 1 TABLET BY MOUTH ONCE DAILY      metoprolol succinate ER (TOPROL-XL) 100 MG XL tablet Take 100 mg by mouth daily       calcium carbonate 600 MG TABS tablet Take 1 tablet by mouth daily      Multiple Vitamins-Minerals (ONE-A-DAY 50 PLUS PO) Take by mouth      terazosin (HYTRIN) 2 MG capsule Take 2 mg by mouth nightly      [DISCONTINUED] polyethylene glycol (GLYCOLAX) 17 g packet Take 17 g by mouth daily (Patient not taking: Reported on 5/25/2021) 30 each 0    [DISCONTINUED] tolterodine (DETROL) 2 MG tablet Take 1 tablet by mouth 2 times daily (Patient taking differently: Take 1 mg by mouth 2 times daily ) 180 tablet 3     No facility-administered encounter medications on file as of 5/25/2021.       Current Outpatient Medications on File Prior to Visit   Medication Sig Dispense Refill    naproxen sodium (ALEVE) 220 MG tablet Take 220 mg by mouth 2 times daily (with meals)      omeprazole (PRILOSEC) 40 MG delayed release capsule Take 1 capsule by mouth 2 times daily 60 capsule 0    valsartan (DIOVAN) 160 MG tablet Take 160 mg by mouth daily      levothyroxine (SYNTHROID) 88 MCG tablet TAKE 1 TABLET BY MOUTH ONCE DAILY      amLODIPine (NORVASC) 5 MG tablet TAKE 1 TABLET BY MOUTH ONCE DAILY      metoprolol succinate Respiratory effort normal, unlabored  Cardiovascular:  Normal peripheral pulses  Abdomen: Soft, non-tender, non-distended with no organomegaly or palpable masses. : No CVA tenderness. Bladder non-tender and not distended. Pelvic: Deferred    Lab Results   Component Value Date    BUN 16 04/29/2021     Lab Results   Component Value Date    CREATININE 0.82 04/29/2021       ASSESSMENT:   Diagnosis Orders   1. Mixed incontinence  OK MEASUREMENT,POST-VOID RESIDUAL VOLUME BY US,NON-IMAGING    Urinalysis with Microscopic    Culture, Urine   2. Frequency of urination  OK MEASUREMENT,POST-VOID RESIDUAL VOLUME BY US,NON-IMAGING    Urinalysis with Microscopic    Culture, Urine   3. Nocturia  OK MEASUREMENT,POST-VOID RESIDUAL VOLUME BY US,NON-IMAGING    Urinalysis with Microscopic    Culture, Urine   4. Constipation, unspecified constipation type     5. OAB (overactive bladder)             PLAN:  We will check a UA C&S    We will stop Detrol    She will start trospium twice per day. This is a macromolecule, so it is less likely to cause constipation    She will increase MiraLAX to twice per day. I did urge her to consume at least 64 ounces of water per day. I did urge her to decrease her coffee intake to only 2 cups/day. We will see her back in 6 weeks or sooner if needed for any new or worsening symptoms.

## 2021-05-26 ENCOUNTER — TELEPHONE (OUTPATIENT)
Dept: UROLOGY | Age: 86
End: 2021-05-26

## 2021-05-26 LAB
CULTURE: NORMAL
Lab: NORMAL
SPECIMEN DESCRIPTION: NORMAL

## 2021-05-26 NOTE — TELEPHONE ENCOUNTER
----- [facility] sent at 5/26/2021  3:00 PM EDT -----  Please call pt - urine culture reviewed and does not show UTI

## 2021-07-29 ENCOUNTER — OFFICE VISIT (OUTPATIENT)
Dept: UROLOGY | Age: 86
End: 2021-07-29
Payer: MEDICARE

## 2021-07-29 ENCOUNTER — HOSPITAL ENCOUNTER (OUTPATIENT)
Age: 86
Setting detail: SPECIMEN
Discharge: HOME | End: 2021-07-29
Payer: MEDICARE

## 2021-07-29 VITALS
WEIGHT: 174 LBS | TEMPERATURE: 97.5 F | DIASTOLIC BLOOD PRESSURE: 82 MMHG | SYSTOLIC BLOOD PRESSURE: 132 MMHG | BODY MASS INDEX: 36.37 KG/M2

## 2021-07-29 DIAGNOSIS — N32.81 OAB (OVERACTIVE BLADDER): ICD-10-CM

## 2021-07-29 DIAGNOSIS — R33.9 INCOMPLETE BLADDER EMPTYING: ICD-10-CM

## 2021-07-29 DIAGNOSIS — N39.46 MIXED INCONTINENCE: Primary | ICD-10-CM

## 2021-07-29 DIAGNOSIS — N39.46 MIXED INCONTINENCE: ICD-10-CM

## 2021-07-29 LAB
-: ABNORMAL
AMORPHOUS: ABNORMAL
BACTERIA: ABNORMAL
BILIRUBIN URINE: NEGATIVE
CASTS UA: ABNORMAL /LPF
COLOR: YELLOW
COMMENT UA: ABNORMAL
CRYSTALS, UA: ABNORMAL /HPF
EPITHELIAL CELLS UA: ABNORMAL /HPF (ref 0–25)
GLUCOSE URINE: NEGATIVE
KETONES, URINE: NEGATIVE
LEUKOCYTE ESTERASE, URINE: NEGATIVE
MUCUS: ABNORMAL
NITRITE, URINE: NEGATIVE
OTHER OBSERVATIONS UA: ABNORMAL
PH UA: 6 (ref 5–9)
PROTEIN UA: NEGATIVE
RBC UA: ABNORMAL /HPF (ref 0–2)
RENAL EPITHELIAL, UA: ABNORMAL /HPF
SPECIFIC GRAVITY UA: <1.005 (ref 1.01–1.02)
TRICHOMONAS: ABNORMAL
TURBIDITY: CLEAR
URINE HGB: NEGATIVE
UROBILINOGEN, URINE: NORMAL
WBC UA: ABNORMAL /HPF (ref 0–5)
YEAST: ABNORMAL

## 2021-07-29 PROCEDURE — 0509F URINE INCON PLAN DOCD: CPT | Performed by: NURSE PRACTITIONER

## 2021-07-29 PROCEDURE — 99214 OFFICE O/P EST MOD 30 MIN: CPT | Performed by: NURSE PRACTITIONER

## 2021-07-29 PROCEDURE — 51798 US URINE CAPACITY MEASURE: CPT | Performed by: NURSE PRACTITIONER

## 2021-07-29 PROCEDURE — G8427 DOCREV CUR MEDS BY ELIG CLIN: HCPCS | Performed by: NURSE PRACTITIONER

## 2021-07-29 PROCEDURE — 1090F PRES/ABSN URINE INCON ASSESS: CPT | Performed by: NURSE PRACTITIONER

## 2021-07-29 PROCEDURE — 1036F TOBACCO NON-USER: CPT | Performed by: NURSE PRACTITIONER

## 2021-07-29 PROCEDURE — 87086 URINE CULTURE/COLONY COUNT: CPT

## 2021-07-29 PROCEDURE — 4040F PNEUMOC VAC/ADMIN/RCVD: CPT | Performed by: NURSE PRACTITIONER

## 2021-07-29 PROCEDURE — G8417 CALC BMI ABV UP PARAM F/U: HCPCS | Performed by: NURSE PRACTITIONER

## 2021-07-29 PROCEDURE — 1123F ACP DISCUSS/DSCN MKR DOCD: CPT | Performed by: NURSE PRACTITIONER

## 2021-07-29 PROCEDURE — 81001 URINALYSIS AUTO W/SCOPE: CPT

## 2021-07-29 RX ORDER — TROSPIUM CHLORIDE 20 MG/1
20 TABLET, FILM COATED ORAL 2 TIMES DAILY
Qty: 60 TABLET | Refills: 3 | Status: SHIPPED | OUTPATIENT
Start: 2021-07-29 | End: 2021-09-14 | Stop reason: SDUPTHER

## 2021-07-29 ASSESSMENT — ENCOUNTER SYMPTOMS
NAUSEA: 0
EYE REDNESS: 0
APNEA: 0
ABDOMINAL PAIN: 0
VOMITING: 0
COLOR CHANGE: 0
CONSTIPATION: 0
WHEEZING: 0
COUGH: 0
SHORTNESS OF BREATH: 0
BACK PAIN: 0

## 2021-07-29 NOTE — PATIENT INSTRUCTIONS
Schedule a Vaccine  When you qualify to receive the vaccine per the [address] guidelines, call the [facility]) COVID-19 Vaccination Hotline to schedule your appointment or to get additional information about the [facility]) locations which are offering the COVID-19 vaccine. To be most effective, it's important that you receive two doses of one of the COVID-19 vaccines. -If you are receiving the [employer] vaccine, your second shot will be scheduled as close to 21 days after the first shot as possible. -If you are receiving the Moderna vaccine, your second shot will be scheduled as close to 28 days after the first shot as possible. [address] Vaccination Hotline: [phone]    In partnership with [facility] and [facility] HEALTH Departments, patients can call [phone], Monday-Friday 8:00am-4:00pm for scheduling at our Hospitals. Or visit the [facility] websites for additional information of vaccine administration locations. Links to [facility]) website and Health Department websites:    [email or web address]    Lists of hospitals in the [place].tn    [email or web address]    SURVEY:    You may be receiving a survey from [employer] regarding your visit today. Please complete the survey to enable us to provide the highest quality of care to you and your family. If you cannot score us a very good on any question, please call the office to discuss how we could have made your experience a very good one. Thank you.       Your MA today: Arcelia Lane

## 2021-07-29 NOTE — PROGRESS NOTES
HPI:        Patient is a 80 y.o. female in no acute distress. She is alert and oriented to person, place, and time. History  2016 referred by PCP for incontinence x 2-3 years. Wears one pad per day which varies in level of saturation. Does not leak overnight. Typically doesn't have nocturia unless she drinks something right before bed. During day she has q2hr frequency, severe urgency, with UUI. She also leaks when she stands up from seated position and when she runs water. She does not notice significant leakage with cough, laugh, sneeze. Tried Ditropan, but never noticed improvement. Improvement with Vesicare, but unable to afford medication due to medication deductible needing met. Detrol 1 mg twice per day. She is urinating every 3 hours during the day, and she is getting up one time per night to urinate. She denies any urinary leakage, urgency, dysuria, or gross hematuria. 9/2020 frequency every 30 minutes to 1 hour. She has nocturia x1. She does complain of both urge and stress incontinence. She is wearing 3-4 pads per day. Resumed detrol 1mg BID    5/2021 nocturia 2-3 times per night, frequency every 30 minutes, wearing 34 pads per day   Stopped Detrol     Started trospium twice per day  Today  Here today to follow-up for frequency and incontinence. At her last visit we did stop Detrol and started trospium twice per day. This has improved her constipation. She is having a daily bowel movement with MiraLAX. Nocturia has improved to 1 times per night. She is urinating every 2 hours during the day. She is now only using on average 2 pads per day. She is very happy with her symptom improvement. Unfortunately her PVR is elevated today compared to prior, 183ml. She does not feel full. She denies dysuria or gross hematuria.   Past Medical History:   Diagnosis Date    Hyperlipidemia     Hypertension     Hypothyroidism     Urinary incontinence      Past Surgical History:   Procedure Laterality Date    ABDOMEN SURGERY  2019    removal of large colon polyp    BACK SURGERY       SECTION      x3    CHOLECYSTECTOMY      COLON SURGERY      COLONOSCOPY      HYSTERECTOMY      JOINT REPLACEMENT       Outpatient Encounter Medications as of 2021   Medication Sig Dispense Refill    trospium (SANCTURA) 20 MG tablet Take 1 tablet by mouth 2 times daily 60 tablet 3    naproxen sodium (ALEVE) 220 MG tablet Take 220 mg by mouth 2 times daily (with meals)      polyethylene glycol (GLYCOLAX) 17 g packet Take 17 g by mouth 2 times daily 180 each 3    omeprazole (PRILOSEC) 40 MG delayed release capsule Take 1 capsule by mouth 2 times daily 60 capsule 0    valsartan (DIOVAN) 160 MG tablet Take 160 mg by mouth daily      levothyroxine (SYNTHROID) 88 MCG tablet TAKE 1 TABLET BY MOUTH ONCE DAILY      amLODIPine (NORVASC) 5 MG tablet TAKE 1 TABLET BY MOUTH ONCE DAILY      metoprolol succinate ER (TOPROL-XL) 100 MG XL tablet Take 100 mg by mouth daily       calcium carbonate 600 MG TABS tablet Take 1 tablet by mouth daily      Multiple Vitamins-Minerals (ONE-A-DAY 50 PLUS PO) Take by mouth      terazosin (HYTRIN) 2 MG capsule Take 2 mg by mouth nightly      [DISCONTINUED] trospium (SANCTURA) 20 MG tablet Take 1 tablet by mouth 2 times daily 60 tablet 3     No facility-administered encounter medications on file as of 2021.       Current Outpatient Medications on File Prior to Visit   Medication Sig Dispense Refill    naproxen sodium (ALEVE) 220 MG tablet Take 220 mg by mouth 2 times daily (with meals)      polyethylene glycol (GLYCOLAX) 17 g packet Take 17 g by mouth 2 times daily 180 each 3    omeprazole (PRILOSEC) 40 MG delayed release capsule Take 1 capsule by mouth 2 times daily 60 capsule 0    valsartan (DIOVAN) 160 MG tablet Take 160 mg by mouth daily      levothyroxine (SYNTHROID) 88 MCG tablet TAKE 1 TABLET BY MOUTH ONCE DAILY      amLODIPine (NORVASC) 5 MG tablet TAKE 1 TABLET BY MOUTH ONCE DAILY      metoprolol succinate ER (TOPROL-XL) 100 MG XL tablet Take 100 mg by mouth daily       calcium carbonate 600 MG TABS tablet Take 1 tablet by mouth daily      Multiple Vitamins-Minerals (ONE-A-DAY 50 PLUS PO) Take by mouth      terazosin (HYTRIN) 2 MG capsule Take 2 mg by mouth nightly       No current facility-administered medications on file prior to visit. Sulfa antibiotics and Percocet [oxycodone-acetaminophen]  Family History   Problem Relation Age of Onset    Stroke Mother     High Blood Pressure Mother     Other Father      Social History     Tobacco Use   Smoking Status Never Smoker   Smokeless Tobacco Never Used       Social History     Substance and Sexual Activity   Alcohol Use No       Review of Systems   Constitutional: Negative for appetite change, chills and fever. Eyes: Negative for redness and visual disturbance. Respiratory: Negative for apnea, cough, shortness of breath and wheezing. Cardiovascular: Negative for chest pain and leg swelling. Gastrointestinal: Negative for abdominal pain, constipation, nausea and vomiting. Genitourinary: Positive for enuresis. Negative for difficulty urinating, dyspareunia, dysuria, flank pain, frequency, hematuria, pelvic pain, urgency, vaginal bleeding and vaginal discharge. Musculoskeletal: Negative for back pain, joint swelling and myalgias. Skin: Negative for color change, rash and wound. Neurological: Negative for dizziness, tremors and numbness. Hematological: Negative for adenopathy. Does not bruise/bleed easily. Psychiatric/Behavioral: Negative for sleep disturbance. /82 (Site: Right Upper Arm, Position: Sitting, Cuff Size: Large Adult) Comment: manual  Temp 97.5 °F (36.4 °C) (Temporal)   Wt 174 lb (78.9 kg)   BMI 36.37 kg/m²       PHYSICAL EXAM:  Constitutional: Patient resting comfortably, in no acute distress.    Neuro: Alert and oriented to person place and time. Cranial nerves grossly intact. Psych: Mood and affect normal.  Skin: Warm, dry  HEENT: normocephalic, atraumatic  Lymphatics: No palpable lymphadenopathy  Lungs: Respiratory effort normal, unlabored  Cardiovascular:  Normal peripheral pulses  Abdomen: Soft, non-tender, non-distended with no organomegaly or palpable masses. : No CVA tenderness. Bladder non-tender and not distended. Pelvic: Deferred    Lab Results   Component Value Date    BUN 16 04/29/2021     Lab Results   Component Value Date    CREATININE 0.82 04/29/2021       ASSESSMENT:   Diagnosis Orders   1. Mixed incontinence  [place] MEASUREMENT,POST-VOID RESIDUAL VOLUME BY US,NON-IMAGING    Urinalysis with Microscopic    Culture, Urine   2. OAB (overactive bladder)  [place] MEASUREMENT,POST-VOID RESIDUAL VOLUME BY US,NON-IMAGING    Urinalysis with Microscopic    Culture, Urine   3. Incomplete bladder emptying  Urinalysis with Microscopic    Culture, Urine           PLAN:  Continue trospium BID for now.  Repeat PVR in 6 weeks    Continue miralax daily    UACS today

## 2021-07-30 LAB
CULTURE: NORMAL
Lab: NORMAL
SPECIMEN DESCRIPTION: NORMAL

## 2021-08-02 ENCOUNTER — TELEPHONE (OUTPATIENT)
Dept: UROLOGY | Age: 86
End: 2021-08-02

## 2021-08-02 NOTE — TELEPHONE ENCOUNTER
----- [facility] at 8/2/2021  9:15 AM EDT -----  Please call pt - urine culture reviewed and does not show UTI

## 2021-09-14 ENCOUNTER — OFFICE VISIT (OUTPATIENT)
Dept: UROLOGY | Age: 86
End: 2021-09-14
Payer: MEDICARE

## 2021-09-14 VITALS
HEIGHT: 58 IN | BODY MASS INDEX: 36.53 KG/M2 | WEIGHT: 174 LBS | DIASTOLIC BLOOD PRESSURE: 62 MMHG | SYSTOLIC BLOOD PRESSURE: 124 MMHG | TEMPERATURE: 97.5 F

## 2021-09-14 DIAGNOSIS — N32.81 OAB (OVERACTIVE BLADDER): ICD-10-CM

## 2021-09-14 DIAGNOSIS — N39.46 MIXED INCONTINENCE: ICD-10-CM

## 2021-09-14 PROCEDURE — 99214 OFFICE O/P EST MOD 30 MIN: CPT | Performed by: NURSE PRACTITIONER

## 2021-09-14 PROCEDURE — G8427 DOCREV CUR MEDS BY ELIG CLIN: HCPCS | Performed by: NURSE PRACTITIONER

## 2021-09-14 PROCEDURE — 51798 US URINE CAPACITY MEASURE: CPT | Performed by: NURSE PRACTITIONER

## 2021-09-14 PROCEDURE — 99211 OFF/OP EST MAY X REQ PHY/QHP: CPT | Performed by: NURSE PRACTITIONER

## 2021-09-14 PROCEDURE — G8417 CALC BMI ABV UP PARAM F/U: HCPCS | Performed by: NURSE PRACTITIONER

## 2021-09-14 PROCEDURE — 1123F ACP DISCUSS/DSCN MKR DOCD: CPT | Performed by: NURSE PRACTITIONER

## 2021-09-14 PROCEDURE — 4040F PNEUMOC VAC/ADMIN/RCVD: CPT | Performed by: NURSE PRACTITIONER

## 2021-09-14 PROCEDURE — 1036F TOBACCO NON-USER: CPT | Performed by: NURSE PRACTITIONER

## 2021-09-14 PROCEDURE — 1090F PRES/ABSN URINE INCON ASSESS: CPT | Performed by: NURSE PRACTITIONER

## 2021-09-14 PROCEDURE — 0509F URINE INCON PLAN DOCD: CPT | Performed by: NURSE PRACTITIONER

## 2021-09-14 RX ORDER — TROSPIUM CHLORIDE 20 MG/1
20 TABLET, FILM COATED ORAL 2 TIMES DAILY
Qty: 180 TABLET | Refills: 3 | Status: SHIPPED | OUTPATIENT
Start: 2021-09-14 | End: 2022-05-19 | Stop reason: SDUPTHER

## 2021-09-14 ASSESSMENT — ENCOUNTER SYMPTOMS
APNEA: 0
EYE REDNESS: 0
COUGH: 0
SHORTNESS OF BREATH: 0
COLOR CHANGE: 0
ABDOMINAL PAIN: 0
BACK PAIN: 0
VOMITING: 0
CONSTIPATION: 0
NAUSEA: 0
WHEEZING: 0

## 2021-09-14 NOTE — PATIENT INSTRUCTIONS
SURVEY:    You may be receiving a survey from [employer] regarding your visit today. Please complete the survey to enable us to provide the highest quality of care to you and your family. If you cannot score us a very good on any question, please call the office to discuss how we could of made your experience a very good one. Thank you.

## 2021-09-14 NOTE — PROGRESS NOTES
HPI:        Patient is a 80 y.o. female in no acute distress. She is alert and oriented to person, place, and time. History  2016 referred by PCP for incontinence x 2-3 years. Wears one pad per day which varies in level of saturation. Does not leak overnight. Typically doesn't have nocturia unless she drinks something right before bed. During day she has q2hr frequency, severe urgency, with UUI. She also leaks when she stands up from seated position and when she runs water. She does not notice significant leakage with cough, laugh, sneeze. Tried Ditropan, but never noticed improvement. Improvement with Vesicare, but unable to afford medication due to medication deductible needing met. Detrol 1 mg twice per day. She is urinating every 3 hours during the day, and she is getting up one time per night to urinate. She denies any urinary leakage, urgency, dysuria, or gross hematuria. 9/2020 frequency every 30 minutes to 1 hour. She has nocturia x1. She does complain of both urge and stress incontinence. She is wearing 3-4 pads per day. Resumed detrol 1mg BID    5/2021 nocturia 2-3 times per night, frequency every 30 minutes, wearing 34 pads per day   Stopped Detrol     Started trospium twice per day  Today  Here today to follow-up for frequency and incontinence. She is having a daily bowel movement with MiraLAX. Nocturia has improved to 1 times per night. She is urinating every 2-3 hours during the day. She is now only using on average 2 pads per day. She is very happy with her symptom improvement, but she does wish for further improvement in her incontinence. PVR is low, 43ml. She denies dysuria or gross hematuria.     Past Medical History:   Diagnosis Date    Hyperlipidemia     Hypertension     Hypothyroidism     Urinary incontinence      Past Surgical History:   Procedure Laterality Date    ABDOMEN SURGERY  2019    removal of large colon polyp    BACK SURGERY       SECTION      x3    CHOLECYSTECTOMY      COLON SURGERY      COLONOSCOPY      HYSTERECTOMY      JOINT REPLACEMENT       Outpatient Encounter Medications as of 2021   Medication Sig Dispense Refill    trospium (SANCTURA) 20 MG tablet Take 1 tablet by mouth 2 times daily 60 tablet 3    naproxen sodium (ALEVE) 220 MG tablet Take 220 mg by mouth 2 times daily (with meals)      polyethylene glycol (GLYCOLAX) 17 g packet Take 17 g by mouth 2 times daily 180 each 3    omeprazole (PRILOSEC) 40 MG delayed release capsule Take 1 capsule by mouth 2 times daily 60 capsule 0    valsartan (DIOVAN) 160 MG tablet Take 160 mg by mouth daily      levothyroxine (SYNTHROID) 88 MCG tablet TAKE 1 TABLET BY MOUTH ONCE DAILY      amLODIPine (NORVASC) 5 MG tablet TAKE 1 TABLET BY MOUTH ONCE DAILY      metoprolol succinate ER (TOPROL-XL) 100 MG XL tablet Take 100 mg by mouth daily       calcium carbonate 600 MG TABS tablet Take 1 tablet by mouth daily      Multiple Vitamins-Minerals (ONE-A-DAY 50 PLUS PO) Take by mouth      terazosin (HYTRIN) 2 MG capsule Take 2 mg by mouth nightly       No facility-administered encounter medications on file as of 2021.       Current Outpatient Medications on File Prior to Visit   Medication Sig Dispense Refill    trospium (SANCTURA) 20 MG tablet Take 1 tablet by mouth 2 times daily 60 tablet 3    naproxen sodium (ALEVE) 220 MG tablet Take 220 mg by mouth 2 times daily (with meals)      polyethylene glycol (GLYCOLAX) 17 g packet Take 17 g by mouth 2 times daily 180 each 3    omeprazole (PRILOSEC) 40 MG delayed release capsule Take 1 capsule by mouth 2 times daily 60 capsule 0    valsartan (DIOVAN) 160 MG tablet Take 160 mg by mouth daily      levothyroxine (SYNTHROID) 88 MCG tablet TAKE 1 TABLET BY MOUTH ONCE DAILY      amLODIPine (NORVASC) 5 MG tablet TAKE 1 TABLET BY MOUTH ONCE DAILY      metoprolol succinate ER (TOPROL-XL) 100 MG XL tablet Take 100 mg by mouth daily  calcium carbonate 600 MG TABS tablet Take 1 tablet by mouth daily      Multiple Vitamins-Minerals (ONE-A-DAY 50 PLUS PO) Take by mouth      terazosin (HYTRIN) 2 MG capsule Take 2 mg by mouth nightly       No current facility-administered medications on file prior to visit. Sulfa antibiotics and Percocet [oxycodone-acetaminophen]  Family History   Problem Relation Age of Onset    Stroke Mother     High Blood Pressure Mother     Other Father      Social History     Tobacco Use   Smoking Status Never Smoker   Smokeless Tobacco Never Used       Social History     Substance and Sexual Activity   Alcohol Use No       Review of Systems   Constitutional: Negative for appetite change, chills and fever. Eyes: Negative for redness and visual disturbance. Respiratory: Negative for apnea, cough, shortness of breath and wheezing. Cardiovascular: Negative for chest pain and leg swelling. Gastrointestinal: Negative for abdominal pain, constipation, nausea and vomiting. Genitourinary: Positive for enuresis. Negative for difficulty urinating, dyspareunia, dysuria, flank pain, frequency, hematuria, pelvic pain, urgency, vaginal bleeding and vaginal discharge. Musculoskeletal: Negative for back pain, joint swelling and myalgias. Skin: Negative for color change, rash and wound. Neurological: Negative for dizziness, tremors and numbness. Hematological: Negative for adenopathy. Does not bruise/bleed easily. Psychiatric/Behavioral: Negative for sleep disturbance. /62 (Site: Right Upper Arm, Position: Sitting, Cuff Size: Medium Adult)   Temp 97.5 °F (36.4 °C) (Temporal)   Ht 4' 10\" (1.473 m)   Wt 174 lb (78.9 kg)   BMI 36.37 kg/m²       PHYSICAL EXAM:  Constitutional: Patient resting comfortably, in no acute distress. Neuro: Alert and oriented to person place and time. Cranial nerves grossly intact.     Psych: Mood and affect normal.  Skin: Warm, dry  HEENT: normocephalic,

## 2021-10-13 ENCOUNTER — TELEPHONE (OUTPATIENT)
Dept: UROLOGY | Age: 86
End: 2021-10-13

## 2021-11-04 ENCOUNTER — PROCEDURE VISIT (OUTPATIENT)
Dept: UROLOGY | Age: 86
End: 2021-11-04
Payer: MEDICARE

## 2021-11-04 VITALS
DIASTOLIC BLOOD PRESSURE: 81 MMHG | TEMPERATURE: 97.7 F | BODY MASS INDEX: 37.16 KG/M2 | HEART RATE: 77 BPM | SYSTOLIC BLOOD PRESSURE: 145 MMHG | WEIGHT: 177 LBS | HEIGHT: 58 IN

## 2021-11-04 DIAGNOSIS — N39.46 MIXED INCONTINENCE: Primary | ICD-10-CM

## 2021-11-04 DIAGNOSIS — M62.81 MUSCLE WEAKNESS: ICD-10-CM

## 2021-11-04 PROCEDURE — 97032 APPL MODALITY 1+ESTIM EA 15: CPT | Performed by: NURSE PRACTITIONER

## 2021-11-04 PROCEDURE — 97750 PHYSICAL PERFORMANCE TEST: CPT | Performed by: NURSE PRACTITIONER

## 2021-11-04 PROCEDURE — 51784 ANAL/URINARY MUSCLE STUDY: CPT | Performed by: NURSE PRACTITIONER

## 2021-11-04 PROCEDURE — 91122 PR ANAL PRESSURE RECORD: CPT | Performed by: NURSE PRACTITIONER

## 2021-11-04 NOTE — PROGRESS NOTES
During Stimulation patient became uncomfortable due to having to have a BM. Patient wanted to stop the procedure because she could not wait to use the restroom. I did stop the procedure due to her request.  She had 1 minute remaining for the stim, she had a total Stim time of 7 minutes today.

## 2021-11-04 NOTE — PATIENT INSTRUCTIONS
SURVEY:    You may be receiving a survey from [employer] regarding your visit today. Please complete the survey to enable us to provide the highest quality of care to you and your family. If you cannot score us a very good on any question, please call the office to discuss how we could have made your experience a very good one. Thank you.   Eleni

## 2021-11-04 NOTE — PROGRESS NOTES
INITIAL PELVIC FLOOR REHAB INTAKE    Symptoms  Daytime voiding frequency: 1.5-2 hours  Nighttime voiding frequency: 1  Urgency: mild  Incontinence episodes per day: uncertain, states that she does not feel urine come out but her pads are wet, Causes: coughing, lifting, standing, sneezing, getting out of the car  Number of pads used per day: uses 3 medium sized pads in a 24 hour period  Pelvic pain: no  Pain with intercourse: n/a  Bowel habits: has almost daily BM, depending on her diet , Fecal incontinence: no    Pertinent History  Previous pelvic surgery: no  Number vaginal deliveries: none Episiotomy: No  Number c-sections: 3  Urology medications/diuretics: none    Intake of spicy/citrus/tomato based foods: rare  Caffeine intake per day: 3 coffees daily, 1 regular 2 decaf  Fluid intake per day: 6, 8 ounce glassed of water and 3 cups of coffee  Alcohol intake: no  Smoking: No    Contraindications  Pacemaker: No  Pregnant/trying to conceive: No  Metal IUD? No  Unstable seizure disorder: No  Active UTI: UACS from 07/29/2021 reviewed and negative    Was able to hold for 2 seconds at moderate amplitude. Fatigue after 3 repetitions. Home exercise regimen prescribed:   Repetitions - 4   Contract - 4 sec   Relax - 10 sec   + 8 Quick Flicks  Frequency- 4 times daily    Stimulated with 34 average mA for 8 minutes.

## 2021-11-11 ENCOUNTER — PROCEDURE VISIT (OUTPATIENT)
Dept: UROLOGY | Age: 86
End: 2021-11-11
Payer: MEDICARE

## 2021-11-11 VITALS
BODY MASS INDEX: 36.37 KG/M2 | SYSTOLIC BLOOD PRESSURE: 153 MMHG | DIASTOLIC BLOOD PRESSURE: 84 MMHG | HEART RATE: 77 BPM | TEMPERATURE: 97.8 F | WEIGHT: 174 LBS

## 2021-11-11 DIAGNOSIS — M62.81 MUSCLE WEAKNESS: ICD-10-CM

## 2021-11-11 DIAGNOSIS — N39.46 MIXED INCONTINENCE: Primary | ICD-10-CM

## 2021-11-11 PROCEDURE — 97032 APPL MODALITY 1+ESTIM EA 15: CPT | Performed by: NURSE PRACTITIONER

## 2021-11-11 PROCEDURE — 97750 PHYSICAL PERFORMANCE TEST: CPT | Performed by: NURSE PRACTITIONER

## 2021-11-11 PROCEDURE — 51784 ANAL/URINARY MUSCLE STUDY: CPT | Performed by: NURSE PRACTITIONER

## 2021-11-11 PROCEDURE — 91122 PR ANAL PRESSURE RECORD: CPT | Performed by: NURSE PRACTITIONER

## 2021-11-11 NOTE — PROGRESS NOTES
PELVIC FLOOR REHAB FOLLOW UP    At last visit (PFR # 1, 1 weeks ago): Was able to hold for 2 seconds at moderate amplitude. Fatigue after 3 repetitions.      Home exercise regimen prescribed:   Repetitions - 4   Contract - 4 sec   Relax - 10 sec   + 8 Quick Flicks  Frequency- 4 times daily     Stimulated with 34 average mA for 8 minutes.        Symptoms  Daytime voiding frequency: q 1.5-2 hrs, unchanged  Nighttime voiding frequency: 0-1 times per night, unchanged  Urgency: mild, unchanged    Incontinence episodes per day: she does not feel urine come out but pads are wet, Causes: coughing, lifting, standing, sneezing, getting out of car, unchanged  Number of pads used per day: 3 medium pads in a 24 hour period, unchanged    Pelvic pain: unchanged, none  Pain with intercourse n/a    Bowel habits: unchanged, every other day  Fecal incontinence: none    OVERALL IMPROVEMENT SINCE INITIAL SESSION:   none    Compliance:  Has pt completed exercises as instructed: Yes    Changes Since Initial Visit  Intake of spicy/citrus/tomato based foods: unchanged  Caffeine intake per day: unchanged  Fluid intake per day: 64 ounces of water a day plus her 2-3 cups of coffee  Alcohol intake: unchanged  Smoking: unchanged    Was able to hold for 4 seconds at weak amplitude. Fatigue after 4 repetitions. Home exercise regimen prescribed:   Repetitions - 6   Contract - 6 sec   Relax - 10 sec   + 10 Quick Flicks  Frequency- 4 times daily    Stimulated with 19 average mA for 10 minutes.

## 2021-11-18 ENCOUNTER — PROCEDURE VISIT (OUTPATIENT)
Dept: UROLOGY | Age: 86
End: 2021-11-18
Payer: MEDICARE

## 2021-11-18 VITALS
HEART RATE: 74 BPM | BODY MASS INDEX: 36.58 KG/M2 | DIASTOLIC BLOOD PRESSURE: 82 MMHG | WEIGHT: 175 LBS | SYSTOLIC BLOOD PRESSURE: 158 MMHG

## 2021-11-18 DIAGNOSIS — N39.46 MIXED INCONTINENCE: Primary | ICD-10-CM

## 2021-11-18 DIAGNOSIS — M62.81 MUSCLE WEAKNESS: ICD-10-CM

## 2021-11-18 PROCEDURE — 97750 PHYSICAL PERFORMANCE TEST: CPT | Performed by: NURSE PRACTITIONER

## 2021-11-18 PROCEDURE — 51784 ANAL/URINARY MUSCLE STUDY: CPT | Performed by: NURSE PRACTITIONER

## 2021-11-18 PROCEDURE — 97032 APPL MODALITY 1+ESTIM EA 15: CPT | Performed by: NURSE PRACTITIONER

## 2021-11-18 PROCEDURE — 91122 PR ANAL PRESSURE RECORD: CPT | Performed by: NURSE PRACTITIONER

## 2021-11-18 NOTE — PROGRESS NOTES
PELVIC FLOOR REHAB FOLLOW UP    At last visit (PFR # 2, 1 weeks ago): Was able to hold for 4 seconds at weak amplitude. Fatigue after 4 repetitions.      Home exercise regimen prescribed:   Repetitions - 6   Contract - 6 sec   Relax - 10 sec   + 10 Quick Flicks  Frequency- 4 times daily     Stimulated with 19 average mA for 10 minutes. Symptoms  Daytime voiding frequency: q 2-2.5 hrs, unchanged  Nighttime voiding frequency: 1 times per night, unchanged  Urgency: moderate, unchanged    Incontinence episodes per day: 2, Causes: with a full bladder, running water, unchanged  Number of pads used per day: 2-3, medium, not saturated when changed. unchanged    Pelvic pain: unchanged  Pain with intercourse unchanged    Bowel habits: unchanged  Fecal incontinence: unchanged    OVERALL IMPROVEMENT SINCE INITIAL SESSION:   minimal in degree    Compliance:  Has pt completed exercises as instructed: Yes    Changes Since Initial Visit  Intake of spicy/citrus/tomato based foods: unchanged  Caffeine intake per day: unchanged  Fluid intake per day: increased  Alcohol intake: unchanged  Smoking: unchanged      Was able to hold for 4-6 seconds at weak amplitude. Fatigue after 4 repetitions. Home exercise regimen prescribed:   Repetitions - 6   Contract - 8 sec   Relax - 10 sec   + 10 Quick Flicks  Frequency- 4 times daily    Stimulated with 12 average mA for 15 minutes.

## 2021-11-24 ENCOUNTER — PROCEDURE VISIT (OUTPATIENT)
Dept: UROLOGY | Age: 86
End: 2021-11-24
Payer: MEDICARE

## 2021-11-24 VITALS
DIASTOLIC BLOOD PRESSURE: 82 MMHG | HEART RATE: 80 BPM | BODY MASS INDEX: 36.37 KG/M2 | SYSTOLIC BLOOD PRESSURE: 140 MMHG | TEMPERATURE: 97.3 F | WEIGHT: 174 LBS

## 2021-11-24 DIAGNOSIS — M62.81 MUSCLE WEAKNESS: ICD-10-CM

## 2021-11-24 DIAGNOSIS — N39.46 MIXED INCONTINENCE: Primary | ICD-10-CM

## 2021-11-24 PROCEDURE — 97032 APPL MODALITY 1+ESTIM EA 15: CPT | Performed by: NURSE PRACTITIONER

## 2021-11-24 PROCEDURE — 51784 ANAL/URINARY MUSCLE STUDY: CPT | Performed by: NURSE PRACTITIONER

## 2021-11-24 PROCEDURE — 97750 PHYSICAL PERFORMANCE TEST: CPT | Performed by: NURSE PRACTITIONER

## 2021-11-24 PROCEDURE — 91122 PR ANAL PRESSURE RECORD: CPT | Performed by: NURSE PRACTITIONER

## 2021-11-24 NOTE — PROGRESS NOTES
PELVIC FLOOR REHAB FOLLOW UP    At last visit (PFR # 3, 1 weeks ago): Was able to hold for 4-6 seconds at weak amplitude. Fatigue after 4 repetitions.      Home exercise regimen prescribed:   Repetitions - 6   Contract - 8 sec   Relax - 10 sec   + 10 Quick Flicks  Frequency- 4 times daily     Stimulated with 12 average mA for 15 minutes.        Symptoms  Daytime voiding frequency: q 2-2.5 hrs, unchanged  Nighttime voiding frequency: 1 times per night, unchanged  Urgency: moderate, unchanged    Incontinence episodes per day: 2, Causes: with a full bladder,  And when water is running unchanged  Number of pads used per day: 2-3 medium pads daily, Not saturated when changed  unchanged    Pelvic pain: unchanged  Pain with intercourse unchanged    Bowel habits: unchanged, every day to every other day depending on what she eats. Fecal incontinence: unchanged, none    OVERALL IMPROVEMENT SINCE INITIAL SESSION:   moderate in degree    Compliance:  Has pt completed exercises as instructed: Yes    Changes Since Initial Visit  Intake of spicy/citrus/tomato based foods: unchanged  Caffeine intake per day: unchanged  Fluid intake per day: unchanged  Alcohol intake: unchanged  Smoking: unchanged    Was able to hold for 6-8 seconds at weak-moderate, wavy amplitude. Fatigue after 8 repetitions. Home exercise regimen prescribed:   Repetitions - 8   Contract - 8 sec   Relax - 10 sec   + 10 Quick Flicks  Frequency- 4 times daily    Stimulated with 15 average mA for 15 minutes.

## 2021-12-02 ENCOUNTER — PROCEDURE VISIT (OUTPATIENT)
Dept: UROLOGY | Age: 86
End: 2021-12-02
Payer: MEDICARE

## 2021-12-02 VITALS
HEART RATE: 74 BPM | WEIGHT: 173 LBS | BODY MASS INDEX: 36.16 KG/M2 | TEMPERATURE: 97.7 F | SYSTOLIC BLOOD PRESSURE: 185 MMHG | DIASTOLIC BLOOD PRESSURE: 94 MMHG

## 2021-12-02 DIAGNOSIS — N39.46 MIXED INCONTINENCE: Primary | ICD-10-CM

## 2021-12-02 DIAGNOSIS — M62.81 MUSCLE WEAKNESS: ICD-10-CM

## 2021-12-02 PROCEDURE — 91122 PR ANAL PRESSURE RECORD: CPT | Performed by: NURSE PRACTITIONER

## 2021-12-02 PROCEDURE — 97032 APPL MODALITY 1+ESTIM EA 15: CPT | Performed by: NURSE PRACTITIONER

## 2021-12-02 PROCEDURE — 97750 PHYSICAL PERFORMANCE TEST: CPT | Performed by: NURSE PRACTITIONER

## 2021-12-02 PROCEDURE — 51784 ANAL/URINARY MUSCLE STUDY: CPT | Performed by: NURSE PRACTITIONER

## 2021-12-02 NOTE — PATIENT INSTRUCTIONS
SURVEY:    You may be receiving a survey from [email or web address] regarding your visit today. Please complete the survey to enable us to provide the highest quality of care to you and your family. If you cannot score us a very good on any question, please call the office to discuss how we could have made your experience a very good one. Thank you.

## 2021-12-02 NOTE — PROGRESS NOTES
PELVIC FLOOR REHAB FOLLOW UP    At last visit (PFR #5 , 1weeks ago): Was able to hold for 6-8 seconds at weak-moderate, wavy amplitude. Fatigue after 8 repetitions.      Home exercise regimen prescribed:   Repetitions - 8   Contract - 8 sec   Relax - 10 sec   + 10 Quick Flicks  Frequency- 4 times daily     Stimulated with 15 average mA for 15 minutes.        Symptoms  Daytime voiding frequency: q 2-2.5 hours hrs, unchanged  Nighttime voiding frequency: 1 times per night, unchanged  Urgency: moderate, unchanged    Incontinence episodes per day: 2, Causes: with a full bladder, and when water is running unchanged  Number of pads used per day: 2, medium pads improved. Often the day time pad is not wet when changed, only changing prior to bed. Night time pad is not wet when waking. Only changed for hygiene. Pelvic pain: unchanged  Pain with intercourse unchanged    Bowel habits: unchanged  Fecal incontinence: unchanged    OVERALL IMPROVEMENT SINCE INITIAL SESSION:   moderate in degree    Compliance:  Has pt completed exercises as instructed: Yes    Changes Since Initial Visit  Intake of spicy/citrus/tomato based foods: unchanged  Caffeine intake per day: unchanged  Fluid intake per day: increased  Alcohol intake: unchanged  Smoking: unchanged    Was able to hold for 10 seconds at weak-moderate amplitude. Fatigue after 10 repetitions. Home exercise regimen prescribed:   Repetitions - 10   Contract - 10 sec   Relax - 10 sec   + 10 Quick Flicks  Frequency- 4 times daily    Stimulated with 10 average mA for 15 minutes.

## 2021-12-09 ENCOUNTER — PROCEDURE VISIT (OUTPATIENT)
Dept: UROLOGY | Age: 86
End: 2021-12-09
Payer: MEDICARE

## 2021-12-09 VITALS
TEMPERATURE: 97.7 F | DIASTOLIC BLOOD PRESSURE: 82 MMHG | WEIGHT: 174 LBS | BODY MASS INDEX: 36.53 KG/M2 | SYSTOLIC BLOOD PRESSURE: 162 MMHG | HEIGHT: 58 IN

## 2021-12-09 DIAGNOSIS — N32.81 OAB (OVERACTIVE BLADDER): ICD-10-CM

## 2021-12-09 DIAGNOSIS — M62.81 MUSCLE WEAKNESS: ICD-10-CM

## 2021-12-09 DIAGNOSIS — N39.46 MIXED INCONTINENCE: Primary | ICD-10-CM

## 2021-12-09 PROCEDURE — 97032 APPL MODALITY 1+ESTIM EA 15: CPT | Performed by: NURSE PRACTITIONER

## 2021-12-09 NOTE — PROGRESS NOTES
PELVIC FLOOR REHAB FOLLOW UP    At last visit (PFR # 5, 1 weeks ago): Was able to hold for 10 seconds at weak-moderate amplitude. Fatigue after 10 repetitions.      Home exercise regimen prescribed:   Repetitions - 10   Contract - 10 sec   Relax - 10 sec   + 10 Quick Flicks  Frequency- 4 times daily     Stimulated with 10 average mA for 15 minutes. Symptoms  Daytime voiding frequency: q 2-2.5 hrs, unchanged  Nighttime voiding frequency: 1 times per night, unchanged  Urgency: moderate, unchanged    Incontinence episodes per day: 2, Causes: with a full bladder, when water is running, unchanged  Number of pads used per day: 2 she states she does not have as many problems like she used too, decreased    Pelvic pain: none, unchanged  Pain with intercourse unchanged    Bowel habits: unchanged, 1 BM daily  Fecal incontinence: none    OVERALL IMPROVEMENT SINCE INITIAL SESSION:   moderate in degree    Compliance:  Has pt completed exercises as instructed: Yes    Changes Since Initial Visit  Intake of spicy/citrus/tomato based foods: unchanged  Caffeine intake per day:1 cup decaf coffee/day unchanged  Fluid intake per day: she does drink 64oz water/dailyunchanged  Alcohol intake: none  Smoking: none      Today  Stimulated with 10 average mA for 15 minutes.

## 2021-12-13 ENCOUNTER — TELEPHONE (OUTPATIENT)
Dept: UROLOGY | Age: 86
End: 2021-12-13

## 2021-12-13 DIAGNOSIS — R35.0 FREQUENCY OF URINATION: Primary | ICD-10-CM

## 2021-12-13 RX ORDER — CIPROFLOXACIN 500 MG/1
500 TABLET, FILM COATED ORAL 2 TIMES DAILY
Qty: 14 TABLET | Refills: 0 | Status: SHIPPED | OUTPATIENT
Start: 2021-12-13 | End: 2021-12-20

## 2021-12-13 NOTE — TELEPHONE ENCOUNTER
Juan Manuel Benitez was in on 2021 for PFR and thinks she might have an infection. She is c/o urinary urgency and frequency and burning with urination. No hematuria and no fever.      Health Maintenance   Topic Date Due    DTaP/Tdap/Td vaccine (1 - Tdap) Never done    Shingles Vaccine (1 of 2) Never done    Pneumococcal 65+ years Vaccine (1 of 1 - PPSV23) Never done   [employer] Visit (AWV)  Never done    COVID-19 Vaccine (3 - Booster for Moderna series) 2021    TSH testing  2022    Potassium monitoring  2022    Creatinine monitoring  2022    Flu vaccine  Completed    Hepatitis A vaccine  Aged Out    Hepatitis B vaccine  Aged Out    Hib vaccine  Aged Out    Meningococcal (ACWY) vaccine  Aged Out             (applicable per patient's age: Cancer Screenings, Depression Screening, Fall Risk Screening, Immunizations)    Hemoglobin A1C (%)   Date Value   2021 5.8     LDL Calculated (mg/dL)   Date Value   2021 168 (H)     AST (U/L)   Date Value   2021 66 (H)     ALT (U/L)   Date Value   2021 71 (H)     BUN (mg/dL)   Date Value   2021 16      (goal A1C is < 7)   (goal LDL is <100) need 30-50% reduction from baseline     BP Readings from Last 3 Encounters:   21 (!) 162/82   21 (!) 185/94   21 (!) 140/82    (goal /80)      All Future Testing planned in [facility]:      Next Visit Date:  Future Appointments   Date Time Provider Adonay [place]   2021  1:15 PM RULA Joseph CNP [facility]   2021 10:15 AM RULA Joseph CNP [facility]   3/29/2022 10:30 AM RULA Joseph CNP [place] [facility]            Patient Active Problem List:     OAB (overactive bladder)     Mixed incontinence     Large bowel obstruction ([address]     Essential hypertension     Chest pain     Partial intestinal obstruction (HCC)     Hypothyroidism

## 2021-12-13 NOTE — TELEPHONE ENCOUNTER
Drop off a UA C&S. We sent in Cipro to start. Take this antibiotic until gone. Take this with food and eat yogurt once per day to prevent GI upset. If you develop nausea, vomiting, or fevers call the office or go to the ER. If your urinary symptoms do not improve once completing the antibiotics call our office. Keep pelvic floor as scheduled for now.

## 2021-12-15 ENCOUNTER — TELEPHONE (OUTPATIENT)
Dept: UROLOGY | Age: 86
End: 2021-12-15

## 2021-12-15 NOTE — TELEPHONE ENCOUNTER
----- Message from RULA Justice - CNP sent at 12/15/2021  9:47 AM EST -----  Call pt - urine cx reviewed and negative for UTI & for significant microhematuria.  Stop antibiotics

## 2021-12-16 ENCOUNTER — PROCEDURE VISIT (OUTPATIENT)
Dept: UROLOGY | Age: 86
End: 2021-12-16
Payer: MEDICARE

## 2021-12-16 VITALS
SYSTOLIC BLOOD PRESSURE: 136 MMHG | BODY MASS INDEX: 36.11 KG/M2 | WEIGHT: 172 LBS | DIASTOLIC BLOOD PRESSURE: 76 MMHG | HEIGHT: 58 IN | HEART RATE: 75 BPM | TEMPERATURE: 96.9 F

## 2021-12-16 DIAGNOSIS — R35.0 FREQUENCY OF URINATION: Primary | ICD-10-CM

## 2021-12-16 DIAGNOSIS — M62.81 MUSCLE WEAKNESS: ICD-10-CM

## 2021-12-16 DIAGNOSIS — N39.46 MIXED INCONTINENCE: ICD-10-CM

## 2021-12-16 PROCEDURE — 97032 APPL MODALITY 1+ESTIM EA 15: CPT | Performed by: NURSE PRACTITIONER

## 2021-12-16 PROCEDURE — 51784 ANAL/URINARY MUSCLE STUDY: CPT | Performed by: NURSE PRACTITIONER

## 2021-12-16 PROCEDURE — 97750 PHYSICAL PERFORMANCE TEST: CPT | Performed by: NURSE PRACTITIONER

## 2021-12-16 PROCEDURE — 91122 PR ANAL PRESSURE RECORD: CPT | Performed by: NURSE PRACTITIONER

## 2021-12-16 NOTE — PROGRESS NOTES
PELVIC FLOOR REHAB FOLLOW UP    At last visit (PFR # 6, 1 weeks ago): Today  Stimulated with 10 average mA for 15 minutes. Symptoms  Daytime voiding frequency: q 2-2.5 hrs, unchanged  Nighttime voiding frequency: 1 times per night, unchanged  Urgency: moderate, unchanged    Incontinence episodes per day: 2, Causes: with a full bladder, she states that running water does not bother her anymore, decreased  Number of pads used per day: 2, decreased, she states the pad she puts on at night before bed she wakes up dry and ends up wearing that pad all day. Pelvic pain: none, she thought she had an infection d/t the amount of pain she was in. She did not know what the pain was from. The pain has gotten better. She stopped taking an ATB since her urine had no infection. Pain with intercourse unchanged    Bowel habits: unchanged 1 BM daily  Fecal incontinence: none    OVERALL IMPROVEMENT SINCE INITIAL SESSION:   marked in degree, she has seen a great change since she has started pelvic floor. Compliance:  Has pt completed exercises as instructed: Yes    Changes Since Initial Visit  Intake of spicy/citrus/tomato based foods: unchanged  Caffeine intake per day: unchanged 2 cups decaf daily and a regular coffee  Fluid intake per day: unchanged She tries to keep up with drinking 64 oz of water daily  Alcohol intake: none  Smoking: none      Was able to hold for 10 seconds at weak amplitude. Fatigue after 10 repetitions. Home exercise regimen prescribed:   Repetitions - 10   Contract - 10 sec   Relax - 10 sec   + 10 Quick Flicks  Frequency- 4 times daily    Stimulated with 7 average mA for 15 minutes.

## 2021-12-16 NOTE — PATIENT INSTRUCTIONS
SURVEY:    You may be receiving a survey from [employer] regarding your visit today. Please complete the survey to enable us to provide the highest quality of care to you and your family. If you cannot score us a very good on any question, please call the office to discuss how we could of made your experience a very good one. Thank you.

## 2022-05-19 ENCOUNTER — OFFICE VISIT (OUTPATIENT)
Dept: UROLOGY | Age: 87
End: 2022-05-19
Payer: MEDICARE

## 2022-05-19 VITALS
WEIGHT: 173 LBS | SYSTOLIC BLOOD PRESSURE: 114 MMHG | DIASTOLIC BLOOD PRESSURE: 63 MMHG | BODY MASS INDEX: 36.16 KG/M2 | HEART RATE: 83 BPM

## 2022-05-19 DIAGNOSIS — N32.81 OAB (OVERACTIVE BLADDER): ICD-10-CM

## 2022-05-19 DIAGNOSIS — N39.46 MIXED INCONTINENCE: Primary | ICD-10-CM

## 2022-05-19 PROCEDURE — 51798 US URINE CAPACITY MEASURE: CPT | Performed by: NURSE PRACTITIONER

## 2022-05-19 PROCEDURE — G8417 CALC BMI ABV UP PARAM F/U: HCPCS | Performed by: NURSE PRACTITIONER

## 2022-05-19 PROCEDURE — 99212 OFFICE O/P EST SF 10 MIN: CPT | Performed by: NURSE PRACTITIONER

## 2022-05-19 PROCEDURE — 4040F PNEUMOC VAC/ADMIN/RCVD: CPT | Performed by: NURSE PRACTITIONER

## 2022-05-19 PROCEDURE — G8427 DOCREV CUR MEDS BY ELIG CLIN: HCPCS | Performed by: NURSE PRACTITIONER

## 2022-05-19 PROCEDURE — PBSHW PR MEASUREMENT,POST-VOID RESIDUAL VOLUME BY US,NON-IMAGING: Performed by: NURSE PRACTITIONER

## 2022-05-19 PROCEDURE — 1123F ACP DISCUSS/DSCN MKR DOCD: CPT | Performed by: NURSE PRACTITIONER

## 2022-05-19 PROCEDURE — 0509F URINE INCON PLAN DOCD: CPT | Performed by: NURSE PRACTITIONER

## 2022-05-19 PROCEDURE — 1090F PRES/ABSN URINE INCON ASSESS: CPT | Performed by: NURSE PRACTITIONER

## 2022-05-19 PROCEDURE — 1036F TOBACCO NON-USER: CPT | Performed by: NURSE PRACTITIONER

## 2022-05-19 RX ORDER — TROSPIUM CHLORIDE 20 MG/1
20 TABLET, FILM COATED ORAL 2 TIMES DAILY
Qty: 180 TABLET | Refills: 3 | Status: SHIPPED | OUTPATIENT
Start: 2022-05-19

## 2022-05-19 ASSESSMENT — ENCOUNTER SYMPTOMS
ABDOMINAL PAIN: 0
EYE REDNESS: 0
WHEEZING: 0
COLOR CHANGE: 0
SHORTNESS OF BREATH: 0
BACK PAIN: 0
NAUSEA: 0
VOMITING: 0
CONSTIPATION: 0
COUGH: 0
APNEA: 0

## 2022-05-19 NOTE — PROGRESS NOTES
HPI:        Patient is a 80 y.o. female in no acute distress. She is alert and oriented to person, place, and time. History  2016 referred by PCP for incontinence x 2-3 years. Wears one pad per day which varies in level of saturation. Does not leak overnight. Typically doesn't have nocturia unless she drinks something right before bed. During day she has q2hr frequency, severe urgency, with UUI. She also leaks when she stands up from seated position and when she runs water. She does not notice significant leakage with cough, laugh, sneeze. Tried Ditropan, but never noticed improvement. Improvement with Vesicare, but unable to afford medication due to medication deductible needing met. Detrol 1 mg twice per day. She is urinating every 3 hours during the day, and she is getting up one time per night to urinate. She denies any urinary leakage, urgency, dysuria, or gross hematuria. 9/2020 frequency every 30 minutes to 1 hour. She has nocturia x1. She does complain of both urge and stress incontinence. She is wearing 3-4 pads per day. Resumed detrol 1mg BID    5/2021 nocturia 2-3 times per night, frequency every 30 minutes, wearing 34 pads per day   Stopped Detrol     Started trospium twice per day    12/2021 Completed 7 sessions of pelvic floor rehab. At the start of pelvic floor therapy she was urinating every 1.5-2 hours and using 3 medium size pads in a 24-hour period. At the end of pelvic floor therapy she was urinating every 2-2.5 hours during the day and only using 1 pad per day. Today  Here today to follow-up for OAB and mixed incontinence. She is urinating every 2-2.5 hours during the day. She has nocturia x1. She denies urgency. She is only using 1 pad per day. He is having a daily bowel movement. She denies any dysuria or gross hematuria. PVR is low.     Past Medical History:   Diagnosis Date    Hyperlipidemia     Hypertension     Hypothyroidism     Urinary incontinence      Past Surgical History:   Procedure Laterality Date    ABDOMEN SURGERY  2019    removal of large colon polyp    BACK SURGERY       SECTION      x3    CHOLECYSTECTOMY      COLON SURGERY      COLONOSCOPY      HYSTERECTOMY      JOINT REPLACEMENT       Outpatient Encounter Medications as of 2022   Medication Sig Dispense Refill    trospium (SANCTURA) 20 MG tablet Take 1 tablet by mouth 2 times daily 180 tablet 3    naproxen sodium (ALEVE) 220 MG tablet Take 220 mg by mouth 2 times daily (with meals) prn      polyethylene glycol (GLYCOLAX) 17 g packet Take 17 g by mouth 2 times daily 180 each 3    omeprazole (PRILOSEC) 40 MG delayed release capsule Take 1 capsule by mouth 2 times daily 60 capsule 0    valsartan (DIOVAN) 160 MG tablet Take 160 mg by mouth daily      levothyroxine (SYNTHROID) 88 MCG tablet TAKE 1 TABLET BY MOUTH ONCE DAILY      amLODIPine (NORVASC) 5 MG tablet TAKE 1 TABLET BY MOUTH ONCE DAILY      metoprolol succinate ER (TOPROL-XL) 100 MG XL tablet Take 100 mg by mouth daily       calcium carbonate 600 MG TABS tablet Take 1 tablet by mouth daily      Multiple Vitamins-Minerals (ONE-A-DAY 50 PLUS PO) Take by mouth      terazosin (HYTRIN) 2 MG capsule Take 2 mg by mouth nightly       No facility-administered encounter medications on file as of 2022.       Current Outpatient Medications on File Prior to Visit   Medication Sig Dispense Refill    trospium (SANCTURA) 20 MG tablet Take 1 tablet by mouth 2 times daily 180 tablet 3    naproxen sodium (ALEVE) 220 MG tablet Take 220 mg by mouth 2 times daily (with meals) prn      polyethylene glycol (GLYCOLAX) 17 g packet Take 17 g by mouth 2 times daily 180 each 3    omeprazole (PRILOSEC) 40 MG delayed release capsule Take 1 capsule by mouth 2 times daily 60 capsule 0    valsartan (DIOVAN) 160 MG tablet Take 160 mg by mouth daily      levothyroxine (SYNTHROID) 88 MCG tablet TAKE 1 TABLET BY MOUTH ONCE DAILY      amLODIPine (NORVASC) 5 MG tablet TAKE 1 TABLET BY MOUTH ONCE DAILY      metoprolol succinate ER (TOPROL-XL) 100 MG XL tablet Take 100 mg by mouth daily       calcium carbonate 600 MG TABS tablet Take 1 tablet by mouth daily      Multiple Vitamins-Minerals (ONE-A-DAY 50 PLUS PO) Take by mouth      terazosin (HYTRIN) 2 MG capsule Take 2 mg by mouth nightly       No current facility-administered medications on file prior to visit. Sulfa antibiotics and Percocet [oxycodone-acetaminophen]  Family History   Problem Relation Age of Onset    Stroke Mother     High Blood Pressure Mother     Other Father      Social History     Tobacco Use   Smoking Status Never Smoker   Smokeless Tobacco Never Used       Social History     Substance and Sexual Activity   Alcohol Use No       Review of Systems   Constitutional: Negative for appetite change, chills and fever. Eyes: Negative for redness and visual disturbance. Respiratory: Negative for apnea, cough, shortness of breath and wheezing. Cardiovascular: Negative for chest pain and leg swelling. Gastrointestinal: Negative for abdominal pain, constipation, nausea and vomiting. Genitourinary: Negative for difficulty urinating, dyspareunia, dysuria, enuresis, flank pain, frequency, hematuria, pelvic pain, urgency, vaginal bleeding and vaginal discharge. Musculoskeletal: Negative for back pain, joint swelling and myalgias. Skin: Negative for color change, rash and wound. Neurological: Negative for dizziness, tremors and numbness. Hematological: Negative for adenopathy. Does not bruise/bleed easily. Psychiatric/Behavioral: Negative for sleep disturbance. /63 (Site: Right Upper Arm, Position: Sitting)   Pulse 83   Wt 173 lb (78.5 kg)   BMI 36.16 kg/m²       PHYSICAL EXAM:  Constitutional: Patient resting comfortably, in no acute distress. Neuro: Alert and oriented to person place and time. Cranial nerves grossly intact.

## 2024-02-23 PROBLEM — S92.415A NONDISPLACED FRACTURE OF PROXIMAL PHALANX OF LEFT GREAT TOE, INITIAL ENCOUNTER FOR CLOSED FRACTURE: Status: ACTIVE | Noted: 2024-02-23

## 2024-02-23 PROBLEM — M79.604 RIGHT LEG PAIN: Status: ACTIVE | Noted: 2024-02-23

## 2024-06-13 ENCOUNTER — OFFICE VISIT (OUTPATIENT)
Dept: UROLOGY | Age: 89
End: 2024-06-13
Payer: MEDICARE

## 2024-06-13 VITALS
TEMPERATURE: 97.7 F | HEART RATE: 76 BPM | DIASTOLIC BLOOD PRESSURE: 62 MMHG | WEIGHT: 164 LBS | BODY MASS INDEX: 34.28 KG/M2 | SYSTOLIC BLOOD PRESSURE: 130 MMHG

## 2024-06-13 DIAGNOSIS — N39.46 MIXED INCONTINENCE: Primary | ICD-10-CM

## 2024-06-13 DIAGNOSIS — N32.81 OAB (OVERACTIVE BLADDER): ICD-10-CM

## 2024-06-13 PROCEDURE — 1090F PRES/ABSN URINE INCON ASSESS: CPT | Performed by: NURSE PRACTITIONER

## 2024-06-13 PROCEDURE — 0509F URINE INCON PLAN DOCD: CPT | Performed by: NURSE PRACTITIONER

## 2024-06-13 PROCEDURE — PBSHW PR MEAS POST-VOIDING RESIDUAL URINE&/BLADDER CAP: Performed by: NURSE PRACTITIONER

## 2024-06-13 PROCEDURE — G8417 CALC BMI ABV UP PARAM F/U: HCPCS | Performed by: NURSE PRACTITIONER

## 2024-06-13 PROCEDURE — 1036F TOBACCO NON-USER: CPT | Performed by: NURSE PRACTITIONER

## 2024-06-13 PROCEDURE — G8427 DOCREV CUR MEDS BY ELIG CLIN: HCPCS | Performed by: NURSE PRACTITIONER

## 2024-06-13 PROCEDURE — 51798 US URINE CAPACITY MEASURE: CPT | Performed by: NURSE PRACTITIONER

## 2024-06-13 PROCEDURE — 99214 OFFICE O/P EST MOD 30 MIN: CPT | Performed by: NURSE PRACTITIONER

## 2024-06-13 PROCEDURE — 1123F ACP DISCUSS/DSCN MKR DOCD: CPT | Performed by: NURSE PRACTITIONER

## 2024-06-13 RX ORDER — MIRABEGRON 50 MG/1
50 TABLET, EXTENDED RELEASE ORAL DAILY
Qty: 30 TABLET | Refills: 2 | Status: SHIPPED | OUTPATIENT
Start: 2024-06-13

## 2024-06-13 NOTE — PROGRESS NOTES
History  2016 referred by PCP for incontinence x 2-3 years. Wears one pad per day which varies in level of saturation. Does not leak overnight. Typically doesn't have nocturia unless she drinks something right before bed. During day she has q2hr frequency, severe urgency, with UUI. She also leaks when she stands up from seated position and when she runs water. She does not notice significant leakage with cough, laugh, sneeze.    Tried Ditropan, but never noticed improvement.       Improvement with Vesicare, but unable to afford medication due to medication deductible needing met.      Detrol 1 mg twice per day.  She is urinating every 3 hours during the day, and she is getting up one time per night to urinate.  She denies any urinary leakage, urgency, dysuria, or gross hematuria.    9/2020 frequency every 30 minutes to 1 hour.  She has nocturia x1.  She does complain of both urge and stress incontinence.  She is wearing 3-4 pads per day.     Resumed detrol 1mg BID    5/2021 nocturia 2-3 times per night, frequency every 30 minutes, wearing 3-4 pads per day   Stopped Detrol     Started trospium twice per day    12/2021 Completed 7 sessions of pelvic floor rehab.  At the start of pelvic floor therapy she was urinating every 1.5-2 hours and using 3 medium size pads in a 24-hour period.  At the end of pelvic floor therapy she was urinating every 2-2.5 hours during the day and only using 1 pad per day.    Today  Here today to follow-up for overactive bladder and mixed incontinence.  She is taking trospium twice per day.  She has nocturia 1-2 times per night.  She is urinating every 2 hours during the day.  She has urge incontinence and stress incontinence only when bending over, but this is rare.  She is using 3-4 medium pads per day.  She is having a daily bowel movement.  PVR is low.    Plan  Add Myrbetriq to trospium    Follow-up in 12 weeks or sooner if needed

## 2024-09-30 ENCOUNTER — OFFICE VISIT (OUTPATIENT)
Dept: UROLOGY | Age: 89
End: 2024-09-30
Payer: MEDICARE

## 2024-09-30 ENCOUNTER — TELEPHONE (OUTPATIENT)
Dept: UROLOGY | Age: 89
End: 2024-09-30

## 2024-09-30 VITALS
HEART RATE: 74 BPM | BODY MASS INDEX: 34.97 KG/M2 | HEIGHT: 58 IN | RESPIRATION RATE: 18 BRPM | DIASTOLIC BLOOD PRESSURE: 78 MMHG | SYSTOLIC BLOOD PRESSURE: 149 MMHG | WEIGHT: 166.6 LBS

## 2024-09-30 DIAGNOSIS — N32.81 OAB (OVERACTIVE BLADDER): ICD-10-CM

## 2024-09-30 DIAGNOSIS — K59.09 OTHER CONSTIPATION: ICD-10-CM

## 2024-09-30 DIAGNOSIS — N39.46 MIXED INCONTINENCE: Primary | ICD-10-CM

## 2024-09-30 PROCEDURE — G8427 DOCREV CUR MEDS BY ELIG CLIN: HCPCS | Performed by: PHYSICIAN ASSISTANT

## 2024-09-30 PROCEDURE — G8417 CALC BMI ABV UP PARAM F/U: HCPCS | Performed by: PHYSICIAN ASSISTANT

## 2024-09-30 PROCEDURE — 1090F PRES/ABSN URINE INCON ASSESS: CPT | Performed by: PHYSICIAN ASSISTANT

## 2024-09-30 PROCEDURE — 0509F URINE INCON PLAN DOCD: CPT | Performed by: PHYSICIAN ASSISTANT

## 2024-09-30 PROCEDURE — 1036F TOBACCO NON-USER: CPT | Performed by: PHYSICIAN ASSISTANT

## 2024-09-30 PROCEDURE — 1123F ACP DISCUSS/DSCN MKR DOCD: CPT | Performed by: PHYSICIAN ASSISTANT

## 2024-09-30 PROCEDURE — 99214 OFFICE O/P EST MOD 30 MIN: CPT | Performed by: PHYSICIAN ASSISTANT

## 2024-09-30 RX ORDER — TROSPIUM CHLORIDE ER 60 MG/1
60 CAPSULE ORAL DAILY
Qty: 30 CAPSULE | Refills: 2 | Status: SHIPPED | OUTPATIENT
Start: 2024-09-30

## 2024-09-30 NOTE — TELEPHONE ENCOUNTER
Patient called and said the Trospium is too much.  The insurance won't pay it at all and the cash price is 141.00.

## 2024-09-30 NOTE — PROGRESS NOTES
stopping the trospium due to running out she did noticed to increase in her urinary frequency.  She urinates every hour during the day.  She uses 3-4 medium sized pads.  She has nocturia x 0-2.  She has a daily bowel movement.  She does take MiraLAX twice a day.  She would like more relief from her symptoms.    Past Medical History:   Diagnosis Date    Hyperlipidemia     Hypertension     Hypothyroidism     Urinary incontinence      Past Surgical History:   Procedure Laterality Date    ABDOMEN SURGERY  2019    removal of large colon polyp    APPENDECTOMY      BACK SURGERY       SECTION      x3    CHOLECYSTECTOMY      COLON SURGERY      COLONOSCOPY      HYSTERECTOMY (CERVIX STATUS UNKNOWN)      JOINT REPLACEMENT       Outpatient Encounter Medications as of 2024   Medication Sig Dispense Refill    trospium (SANCTURA) 60 MG CP24 extended release capsule Take 1 capsule by mouth daily 30 capsule 2    naproxen sodium (ANAPROX) 220 MG tablet Take 1 tablet by mouth 2 times daily (with meals) prn      polyethylene glycol (GLYCOLAX) 17 g packet Take 17 g by mouth 2 times daily 180 each 3    omeprazole (PRILOSEC) 40 MG delayed release capsule Take 1 capsule by mouth 2 times daily 60 capsule 0    valsartan (DIOVAN) 160 MG tablet Take 1 tablet by mouth daily      levothyroxine (SYNTHROID) 88 MCG tablet TAKE 1 TABLET BY MOUTH ONCE DAILY      amLODIPine (NORVASC) 5 MG tablet TAKE 1 TABLET BY MOUTH ONCE DAILY      metoprolol succinate ER (TOPROL-XL) 100 MG XL tablet Take 1 tablet by mouth daily      calcium carbonate 600 MG TABS tablet Take 1 tablet by mouth daily      Multiple Vitamins-Minerals (ONE-A-DAY 50 PLUS PO) Take by mouth      terazosin (HYTRIN) 2 MG capsule Take 1 capsule by mouth nightly      [DISCONTINUED] mirabegron (MYRBETRIQ) 50 MG TB24 Take 50 mg by mouth daily 30 tablet 2    [DISCONTINUED] trospium (SANCTURA) 20 MG tablet Take 1 tablet by mouth 2 times daily 180 tablet 3     No facility-administered

## 2024-10-01 NOTE — TELEPHONE ENCOUNTER
I initiated a formulary exception for the Trospium.  I also tried for a tier exception on the myrbetriq because that was the preferred med.

## 2024-10-03 RX ORDER — MIRABEGRON 50 MG/1
50 TABLET, EXTENDED RELEASE ORAL DAILY
Qty: 30 TABLET | Refills: 3 | Status: SHIPPED | OUTPATIENT
Start: 2024-10-03

## 2024-10-03 NOTE — TELEPHONE ENCOUNTER
The Trospium was approved but it is 130.08 and patient said she can't afford that.  I know they won't do a tier exception on a med that was originally non formulary.  The tier exception on the Myrbetriq was denied. If Myrbetriq or Gemtesa is appropriate  you can send and I can check the cost. They are on the formulary.

## 2024-10-07 NOTE — TELEPHONE ENCOUNTER
The Myrbetriq is 101.26.  Candice said patient could come in and discuss interstim.  I left message for patient to call the office.

## 2024-10-08 NOTE — TELEPHONE ENCOUNTER
Patient said that is still a lot of money for the Myrbetriq. She is coming in on 10/17 to discuss interstim.

## 2024-10-17 ENCOUNTER — OFFICE VISIT (OUTPATIENT)
Dept: UROLOGY | Age: 89
End: 2024-10-17

## 2024-10-17 VITALS
HEIGHT: 58 IN | DIASTOLIC BLOOD PRESSURE: 62 MMHG | WEIGHT: 163 LBS | TEMPERATURE: 97.3 F | SYSTOLIC BLOOD PRESSURE: 138 MMHG | BODY MASS INDEX: 34.22 KG/M2

## 2024-10-17 DIAGNOSIS — N39.41 URGE INCONTINENCE: ICD-10-CM

## 2024-10-17 DIAGNOSIS — R35.0 FREQUENCY OF URINATION: Primary | ICD-10-CM

## 2024-10-17 DIAGNOSIS — N32.81 OAB (OVERACTIVE BLADDER): ICD-10-CM

## 2024-10-17 RX ORDER — ROSUVASTATIN CALCIUM 5 MG/1
5 TABLET, COATED ORAL DAILY
COMMUNITY
Start: 2024-09-19

## 2024-10-17 RX ORDER — AMLODIPINE AND VALSARTAN 5; 320 MG/1; MG/1
1 TABLET ORAL DAILY
COMMUNITY
Start: 2024-08-05

## 2024-10-17 NOTE — PROGRESS NOTES
History  2016 referred by PCP for incontinence x 2-3 years. Wears one pad per day which varies in level of saturation. Does not leak overnight. Typically doesn't have nocturia unless she drinks something right before bed. During day she has q2hr frequency, severe urgency, with UUI. She also leaks when she stands up from seated position and when she runs water. She does not notice significant leakage with cough, laugh, sneeze.    Tried Ditropan, but never noticed improvement.       Improvement with Vesicare, but unable to afford medication due to medication deductible needing met.      Detrol 1 mg twice per day.  She is urinating every 3 hours during the day, and she is getting up one time per night to urinate.  She denies any urinary leakage, urgency, dysuria, or gross hematuria.    9/2020 frequency every 30 minutes to 1 hour.  She has nocturia x1.  She does complain of both urge and stress incontinence.  She is wearing 3-4 pads per day.     Resumed detrol 1mg BID    5/2021 nocturia 2-3 times per night, frequency every 30 minutes, wearing 3-4 pads per day   Stopped Detrol     Started trospium twice per day    12/2021 Completed 7 sessions of pelvic floor rehab.  At the start of pelvic floor therapy she was urinating every 1.5-2 hours and using 3 medium size pads in a 24-hour period.  At the end of pelvic floor therapy she was urinating every 2-2.5 hours during the day and only using 1 pad per day.    Today  Here today to follow-up for overactive bladder and mixed incontinence.  She urinates every hour during the day.  She uses 3-4 medium sized pads.  She has nocturia x 0-2.  She has a daily bowel movement.  She does take MiraLAX twice a day.  She would like more relief from her symptoms.  She was previously on Ditropan, Vesicare, Detrol, and trospium without relief.  She completed pelvic floor therapy.  We did attempt to have her start Myrbetriq, but her insurance will not cover this medication or help us make the

## 2024-12-02 ENCOUNTER — TELEPHONE (OUTPATIENT)
Dept: UROLOGY | Age: 88
End: 2024-12-02

## 2024-12-02 NOTE — TELEPHONE ENCOUNTER
Patient is scheduled for follow up visit on 12/05/2024.  The patient is scheduled for PNE 12/12/2024.  Please advise if the patient needs to keep the appt on 12/05/2004.

## 2024-12-12 ENCOUNTER — TELEPHONE (OUTPATIENT)
Dept: UROLOGY | Age: 88
End: 2024-12-12

## 2024-12-12 NOTE — TELEPHONE ENCOUNTER
Patient called yesterday and canceled her PNE for today.  She did not want to reschedule it.  She does not want to do it.